# Patient Record
Sex: FEMALE | Race: WHITE | Employment: UNEMPLOYED | URBAN - METROPOLITAN AREA
[De-identification: names, ages, dates, MRNs, and addresses within clinical notes are randomized per-mention and may not be internally consistent; named-entity substitution may affect disease eponyms.]

---

## 2017-08-16 PROBLEM — J30.9 ALLERGIC RHINITIS DUE TO ALLERGEN: Status: ACTIVE | Noted: 2017-08-16

## 2019-08-13 PROBLEM — R05.9 COUGH: Status: ACTIVE | Noted: 2019-08-13

## 2021-07-30 ENCOUNTER — HOSPITAL ENCOUNTER (EMERGENCY)
Age: 56
Discharge: HOME | End: 2021-07-30
Attending: EMERGENCY MEDICINE
Payer: MEDICARE

## 2021-07-30 ENCOUNTER — APPOINTMENT (OUTPATIENT)
Dept: GENERAL RADIOLOGY | Age: 56
End: 2021-07-30
Attending: EMERGENCY MEDICINE
Payer: MEDICARE

## 2021-07-30 VITALS
TEMPERATURE: 97.5 F | SYSTOLIC BLOOD PRESSURE: 140 MMHG | DIASTOLIC BLOOD PRESSURE: 67 MMHG | HEIGHT: 63 IN | HEART RATE: 73 BPM | WEIGHT: 107 LBS | RESPIRATION RATE: 16 BRPM | BODY MASS INDEX: 18.96 KG/M2 | OXYGEN SATURATION: 98 %

## 2021-07-30 DIAGNOSIS — J39.2 PHARYNGEAL PAIN: Primary | ICD-10-CM

## 2021-07-30 PROCEDURE — 99283 EMERGENCY DEPT VISIT LOW MDM: CPT

## 2021-07-30 PROCEDURE — 70360 X-RAY EXAM OF NECK: CPT

## 2021-07-30 PROCEDURE — 74011250637 HC RX REV CODE- 250/637: Performed by: EMERGENCY MEDICINE

## 2021-07-30 RX ORDER — TRIPROLIDINE/PSEUDOEPHEDRINE 2.5MG-60MG
400 TABLET ORAL
Status: COMPLETED | OUTPATIENT
Start: 2021-07-30 | End: 2021-07-30

## 2021-07-30 RX ADMIN — IBUPROFEN 400 MG: 200 SUSPENSION ORAL at 19:01

## 2021-07-30 NOTE — ED TRIAGE NOTES
Patient arrives ambulatory to triage with mask in place. Patient reports swallowing chicken bone at adult . Reports difficulty swallowing. Denies shortness of breath, able to speak in complete sentences. Hx TBI.

## 2021-07-30 NOTE — ED NOTES
I have reviewed discharge instructions with the patient and caregiver. The patient and caregiver verbalized understanding. Patient left ED via Discharge Method: ambulatory to Home with her family member. Opportunity for questions and clarification provided. Patient given 0 scripts. To continue your aftercare when you leave the hospital, you may receive an automated call from our care team to check in on how you are doing.  This is a free service and part of our promise to provide the best care and service to meet your aftercare needs. \" If you have questions, or wish to unsubscribe from this service please call [phone].  Thank you for Choosing our [facility] Emergency Department.

## 2021-07-30 NOTE — ED PROVIDER NOTES
50-year-old white female with underlying developmental delay was at  today around noon and thinks she may have swallowed a small piece of chicken bone. She is complaining of some discomfort in her throat. She has been able to drink fluids without difficulty. She has not tried solids. No chest pain or shortness of breath. No vomiting. The history is provided by the patient. Foreign Body Swallowed  Pertinent negatives include no fever, no chest pain and no vomiting. Past Medical History:   Diagnosis Date    Acute gastrointestinal bleeding 9/30/2015    Anxiety and depression     ASCAD-Native Artery w/o Angina Pectoris, ASCAD NOS 5/10/2016    Asthma exacerbation     chronic steroids    Autism 9/25/2015    Chest pain 9/24/2015    Chronic brain syndrome 5/10/2016    Coronary artery disease     Essential hypertension 5/10/2016    Hyperlipidemia 5/10/2016    Moderate persistent asthma without complication 2/01/6223    [facility]STEMI [facility]non-ST elevated [facility] infarction) [facility]     Seizures ([facility] 9/25/2015    TBI (traumatic brain injury) ([facility] 9/25/2015    Type 1 diabetes mellitus ([facility] 9/24/2015    Type 2 diabetes mellitus without complications ([facility] 7/99/5191       Past Surgical History:   Procedure Laterality Date    HX CORONARY STENT PLACEMENT  9/2015.     HX HEART CATHETERIZATION      HX ORTHOPAEDIC           Family History:   Problem Relation Age of Onset    Stroke Mother     Heart Disease Father     Coronary Artery Disease Father     Diabetes Other     Hypertension Other        Social History     Socioeconomic History    Marital status: SINGLE     Spouse name: Not on file    Number of children: Not on file    Years of education: Not on file    Highest education level: Not on file   Occupational History    Not on file   Tobacco Use    Smoking status: Never Smoker    Smokeless tobacco: Never Used   Substance and Sexual Activity    Alcohol use: No     Alcohol/week: 0.0 standard drinks    Drug use: No    Sexual activity: Not on file   Other Topics Concern    Not on file   Social History Narrative    Sister, Geni Hunt, is her caregiver now. Previously cared for by her father. She has had traumatic brain injury. Social Determinants of Health     Financial Resource Strain:     Difficulty of Paying Living Expenses:    Food Insecurity:     Worried About Running Out of Food in the Last Year:     [address] in the Last Year:    Transportation Needs:     Lack of Transportation (Medical):  Lack of Transportation (Non-Medical):    Physical Activity:     Days of Exercise per Week:     Minutes of Exercise per Session:    Stress:     Feeling of Stress :    Social Connections:     Frequency of Communication with Friends and Family:     Frequency of Social Gatherings with Friends and Family:     Attends Church Services:     Active Member of Clubs or Organizations:     Attends Club or Organization Meetings:     Marital Status:    Intimate Partner Violence:     Fear of Current or Ex-Partner:     Emotionally Abused:     Physically Abused:     Sexually Abused: ALLERGIES: Keppra [levetiracetam]; Other medication; Other plant, animal, environmental; and Statins-hmg-coa reductase inhibitors    Review of Systems   Constitutional: Negative for fever. Respiratory: Negative for shortness of breath. Cardiovascular: Negative for chest pain. Gastrointestinal: Negative for vomiting. Neurological: Negative for headaches. Vitals:    07/30/21 1618 07/30/21 1911   BP: (!) 147/68 (!) 140/67   Pulse: 73    Resp: 16    Temp: 97.5 °F (36.4 °C)    SpO2: 98% 98%   Weight: 48.5 kg (107 lb)    Height: 5' 3\" (1.6 m)             Physical Exam  Vitals and nursing note reviewed. Constitutional:       General: She is not in acute distress. Appearance: Normal appearance. She is not toxic-appearing. HENT:      Head: Normocephalic and atraumatic.       Nose: Nose normal.      Mouth/Throat:      Mouth: Mucous membranes are moist.      Pharynx: Oropharynx is clear. Comments: No signs of trauma. No visible foreign bodies. No drooling. Voice is clear. Eyes:      Conjunctiva/sclera: Conjunctivae normal.      Pupils: Pupils are equal, round, and reactive to light. Neck:      Comments: No stridor  Cardiovascular:      Rate and Rhythm: Normal rate and regular rhythm. Heart sounds: No murmur heard. Pulmonary:      Effort: Pulmonary effort is normal.      Breath sounds: Normal breath sounds. No wheezing. Musculoskeletal:      Cervical back: Normal range of motion and neck supple. Skin:     General: Skin is warm and dry. Neurological:      Mental Status: She is alert. Psychiatric:         Mood and Affect: Mood normal.         Behavior: Behavior normal.          MDM  Number of Diagnoses or Management Options  Diagnosis management comments: X-ray of the neck shows no foreign body. Patient is tolerating intake of both liquids and solids without difficulty. At this time I do not suspect retained foreign body but suspect possible pharyngeal abrasion. She appears safe for discharge home. Follow-up with primary care on Monday.   If symptoms persist she may need GI referral.       Amount and/or Complexity of Data Reviewed  Tests in the radiology section of CPT®: ordered and reviewed  Tests in the medicine section of CPT®: reviewed and ordered  Independent visualization of images, tracings, or specimens: yes    Risk of Complications, Morbidity, and/or Mortality  Presenting problems: moderate  Diagnostic procedures: moderate  Management options: moderate           Procedures

## 2022-03-19 PROBLEM — R05.9 COUGH: Status: ACTIVE | Noted: 2019-08-13

## 2022-03-20 PROBLEM — J30.9 ALLERGIC RHINITIS: Status: ACTIVE | Noted: 2017-08-16

## 2022-07-01 NOTE — PROGRESS NOTES
She is a 25-year-old female seen today for follow-up of asthma, allergic rhinitis, chronic cough. Em Vaz brother assists with history.  Her sister, who usually accompanies her to appointments, as well as her sister's  passed away secondary to Covid.     She is currently residing with her brother, Nasreen More. She is appropriately conversant during today's appointment.     DIAGNOSTICS:       ECHO 9/2015 - EF 55 - 60%. Cardiac cath/stent 9/2015 (severe multivessel disease; stent to LAD, left CX)    CXR 9/2015. CXR 10/30/2015. Spirometry 1/25/2016 - normal, borderline mild obstruction. Spirometry 12/5/2016 moderately severe obstructive defect with decreased FVC, significant decline in FVC and FEV1. Spirometry 8/16/2017mild restrictive defect, significant improvement especially in FEV1.   Spirometry 8/10/2018mild restrictive defect.  No significant interval change. Remains improved compared to 2016 study. Spirometry 8/13/2019mild restrictive defect, no significant change.

## 2022-07-05 ENCOUNTER — OFFICE VISIT (OUTPATIENT)
Dept: PULMONOLOGY | Age: 57
End: 2022-07-05
Payer: MEDICARE

## 2022-07-05 VITALS
DIASTOLIC BLOOD PRESSURE: 58 MMHG | HEIGHT: 63 IN | OXYGEN SATURATION: 98 % | BODY MASS INDEX: 23.21 KG/M2 | TEMPERATURE: 96.9 F | SYSTOLIC BLOOD PRESSURE: 126 MMHG | HEART RATE: 89 BPM | WEIGHT: 131 LBS

## 2022-07-05 DIAGNOSIS — J45.40 MODERATE PERSISTENT ASTHMA WITHOUT COMPLICATION: Primary | Chronic | ICD-10-CM

## 2022-07-05 DIAGNOSIS — F09 CHRONIC BRAIN SYNDROME: ICD-10-CM

## 2022-07-05 DIAGNOSIS — J30.2 SEASONAL ALLERGIC RHINITIS, UNSPECIFIED TRIGGER: ICD-10-CM

## 2022-07-05 LAB
EXPIRATORY TIME: NORMAL
FEF 25-75% %PRED-PRE: NORMAL
FEF 25-75% PRED: NORMAL
FEF 25-75%-PRE: NORMAL
FEV1 %PRED-PRE: 56 %
FEV1 PRED: NORMAL
FEV1/FVC %PRED-PRE: NORMAL
FEV1/FVC PRED: NORMAL
FEV1/FVC: 73 %
FEV1: 1.44 L
FVC %PRED-PRE: 60 %
FVC PRED: NORMAL
FVC: 1.97 L
PEF %PRED-PRE: NORMAL
PEF PRED: NORMAL
PEF-PRE: NORMAL

## 2022-07-05 PROCEDURE — 99213 OFFICE O/P EST LOW 20 MIN: CPT | Performed by: NURSE PRACTITIONER

## 2022-07-05 PROCEDURE — 3017F COLORECTAL CA SCREEN DOC REV: CPT | Performed by: NURSE PRACTITIONER

## 2022-07-05 PROCEDURE — G8427 DOCREV CUR MEDS BY ELIG CLIN: HCPCS | Performed by: NURSE PRACTITIONER

## 2022-07-05 PROCEDURE — 94010 BREATHING CAPACITY TEST: CPT | Performed by: NURSE PRACTITIONER

## 2022-07-05 PROCEDURE — G8420 CALC BMI NORM PARAMETERS: HCPCS | Performed by: NURSE PRACTITIONER

## 2022-07-05 PROCEDURE — 1036F TOBACCO NON-USER: CPT | Performed by: NURSE PRACTITIONER

## 2022-07-05 RX ORDER — IPRATROPIUM BROMIDE 21 UG/1
SPRAY, METERED NASAL
Qty: 1 EACH | Refills: 11 | Status: SHIPPED | OUTPATIENT
Start: 2022-07-05

## 2022-07-05 ASSESSMENT — PULMONARY FUNCTION TESTS
FVC_PERCENT_PREDICTED_PRE: 60
FEV1/FVC: 73
FEV1_PERCENT_PREDICTED_PRE: 56
FEV1: 1.44
FVC: 1.97

## 2022-07-05 ASSESSMENT — ENCOUNTER SYMPTOMS
NAUSEA: 0
SINUS PAIN: 1
CHEST TIGHTNESS: 0
DIARRHEA: 0
CONSTIPATION: 0
COUGH: 0
SHORTNESS OF BREATH: 0
SINUS PRESSURE: 1
WHEEZING: 0
VOMITING: 0
ABDOMINAL PAIN: 0

## 2022-07-05 NOTE — Clinical Note
[facility]  [address] [place] [address] rBo Manriquez [address]  Phone: [phone]  Fax: [address] [facility] DANILO Mendoza CNP        July 5, 2022     Patient: Igor Whitman   YOB: 1965   Date of Visit: 7/5/2022       To Whom It May Concern: It is my medical opinion that Inge Rosen {Work release (duty restriction):28299}. If you have any questions or concerns, please don't hesitate to call.     Sincerely,        DANILO Alonso - CNP

## 2022-07-05 NOTE — PATIENT INSTRUCTIONS
Continue Flovent 1 inhalation twice daily, rinse mouth after use. Continue albuterol via nebulizer, 1 - 4 times daily if needed. May use albuterol inhaler, 2 puffs 4 times daily if needed, when away from home/nebulizer. Continue Flonase 1 spray each nostril twice daily. Continue Singulair 10 mg at bedtime. Stop claritin, take generic zyrtec or allegra every morning, can take benadryl at bedtime. If no benefit from changing claritin to different antihistamine, begin atrovent nasal spray - I printed prescription for this. Order for new nebulizer, tubing and face mask (her preference).  [facility]. [phone]

## 2022-07-05 NOTE — PROGRESS NOTES
[address] Marilynn Adkins. [address] [facility], [address] [facility]  [phone]    Patient Name:  Alyse Novoa    YOB: 1965    Office Visit 7/5/2022      CHIEF COMPLAINT:      Chief Complaint   Patient presents with    Asthma    Cough    Follow-up         HISTORY OF PRESENT ILLNESS:      She is a very pleasant 54-year-old female seen today for follow-up of asthma, allergic rhinitis, chronic cough. Casie Decker brother assists with history and provides transportation to her medical appointments.  Her sister, who usually accompanies her to appointments, as well as her sister's  passed away secondary to Covid.     She is currently residing with her brother, Ana Del Real. Maida Moser also attends adult . She is appropriately conversant during today's appointment. States that she has been doing well from a respiratory standpoint until issues with her usual seasonal allergies. She has had some drainage, cough but no purulent sputum or purulent nasal drainage. Drainage is bothersome both during the day as well as nighttime. Denies fever or chills. No wheezing or significant shortness of breath. She has been taking Claritin in the morning and Benadryl in the evening. Also uses fluticasone nasal spray every morning and Singulair at bedtime. She has tried using nasal saline rinse in the past but did not tolerate it very well. She has been compliant with Flovent inhaler, even using it once daily until recent issues, which prompted her to increase it to twice daily. Uses albuterol intermittently with good response. States that she needs new tubing and facemask for nebulizer. States that she does not do well with mouthpiece tubing.     DIAGNOSTICS:       ECHO 9/2015 - EF 55 - 60%. Cardiac cath/stent 9/2015 (severe multivessel disease; stent to LAD, left CX)    CXR 9/2015. CXR 10/30/2015. Spirometry 1/25/2016 - normal, borderline mild obstruction.   Spirometry 12/5/2016- moderately severe obstructive defect with decreased FVC, significant decline in FVC and FEV1. Spirometry 8/16/2017-mild restrictive defect, significant improvement especially in FEV1.   Spirometry 8/10/2018-mild restrictive defect.  No significant interval change. Remains improved compared to 2016 study. Spirometry 8/13/2019-mild restrictive defect, no significant change.   Spirometry 7/5/2022- suggest mild restrictive defect with slight decline in FVC and FEV1, some of which may be related to weight gain. Past Medical History:   Diagnosis Date    Acute gastrointestinal bleeding 9/30/2015    Anxiety and depression     Asthma exacerbation     chronic steroids    Atherosclerosis of native coronary artery without angina pectoris 5/10/2016    Autism 9/25/2015    Chest pain 9/24/2015    Chronic brain syndrome 5/10/2016    Coronary artery disease     Essential hypertension 5/10/2016    Hyperlipidemia 5/10/2016    Moderate persistent asthma without complication 3/45/6146    NSTEMI (non-ST elevated myocardial infarction) ([address]     Seizures ([address] 9/25/2015    TBI (traumatic brain injury) ([address] 9/25/2015    Type 1 diabetes mellitus ([address] 9/24/2015    Type 2 diabetes mellitus without complications ([address] 2/26/0519       Patient Active Problem List   Diagnosis    Seizures ([address]    Chronic brain syndrome    Hyperlipidemia    Type 2 diabetes mellitus without complications ([address]    Coronary artery disease of native artery of native heart with stable angina pectoris ([address]    TBI (traumatic brain injury) ([address]    Essential hypertension    Cough    Autism    Moderate persistent asthma without complication    Type 2 diabetes mellitus without complication ([address]    Allergic rhinitis         Past Surgical History:   Procedure Laterality Date    CARDIAC CATHETERIZATION      CORONARY ANGIOPLASTY WITH STENT PLACEMENT  9/2015.     ORTHOPEDIC SURGERY           Social History     Socioeconomic History    Marital status: Single     Spouse name: None    Number of children: None    Years of education: None    Highest education level: None   Occupational History    None   Tobacco Use    Smoking status: Never Smoker    Smokeless tobacco: Never Used   Substance and Sexual Activity    Alcohol use: No     Alcohol/week: 0.0 standard drinks    Drug use: No    Sexual activity: None   Other Topics Concern    None   Social History Narrative    He brother, Dipak Mccray, is her caregiver now. Previously cared for by her father. , then her sister. Both have passes away. She has had traumatic brain injury. Social Determinants of Health     Financial Resource Strain:     Difficulty of Paying Living Expenses: Not on file   Food Insecurity:     Worried About Running Out of Food in the Last Year: Not on file    Funmilayo of Food in the Last Year: Not on file   Transportation Needs:     Lack of Transportation (Medical): Not on file    Lack of Transportation (Non-Medical):  Not on file   Physical Activity:     Days of Exercise per Week: Not on file    Minutes of Exercise per Session: Not on file   Stress:     Feeling of Stress : Not on file   Social Connections:     Frequency of Communication with Friends and Family: Not on file    Frequency of Social Gatherings with Friends and Family: Not on file    Attends Mandaeism Services: Not on file    Active Member of [address] [place] [address] [employer] or Organizations: Not on file    Attends Club or Organization Meetings: Not on file    Marital Status: Not on file   Intimate Partner Violence:     Fear of Current or Ex-Partner: Not on file    Emotionally Abused: Not on file    Physically Abused: Not on file    Sexually Abused: Not on file   Housing Stability:     Unable to Pay for Housing in the Last Year: Not on file    Number of [place] in the Last Year: Not on file    Unstable Housing in the Last Year: Not on file       Family History   Problem Relation Age of Onset    Stroke Mother     Heart Disease Father     Coronary Art Dis Father     Hypertension Other     Diabetes Other        Allergies   Allergen Reactions    Levetiracetam Other (See Comments)     Disorientation / confusion        Current Outpatient Medications   Medication Sig    Multiple Vitamin (MULTIVITAMINS PO) Take by mouth    albuterol sulfate  (90 Base) MCG/ACT inhaler Inhale 2 puffs into the lungs every 4 hours as needed    albuterol (PROVENTIL) (2.5 MG/3ML) 0.083% nebulizer solution Inhale 2.5 mg into the lungs 4 times daily    aspirin 81 MG chewable tablet Take 81 mg by mouth daily    vitamin D (CHOLECALCIFEROL) 25 MCG (1000 UT) TABS tablet Take by mouth daily    clopidogrel (PLAVIX) 75 MG tablet Take 75 mg by mouth daily    fluticasone propionate (FLOVENT DISKUS) 250 MCG/BLIST AEPB inhaler 1 inhalation bid, rinse mouth after use    fluticasone (FLONASE) 50 MCG/ACT nasal spray 2 sprays each nostrils as needed    insulin aspart protamine-insulin aspart (NOVOLOG 70/30) (70-30) 100 UNIT/ML injection Inject into the skin every morning (before breakfast)    insulin glargine (LANTUS) 100 UNIT/ML injection vial Inject into the skin    loratadine (CLARITIN) 10 MG tablet Take 10 mg by mouth    montelukast (SINGULAIR) 10 MG tablet 1 po q hs    nitroGLYCERIN (NITROSTAT) 0.3 MG SL tablet Place under the tongue    phenytoin (DILANTIN) 100 MG ER capsule Take 100 mg by mouth 3 times daily     No current facility-administered medications for this visit. REVIEW OF SYSTEMS:    Review of Systems   Constitutional: Positive for unexpected weight change. Negative for chills, fatigue and fever. She has gained 24 pounds over the past year   HENT: Positive for sinus pressure and sinus pain. Respiratory: Negative for cough, chest tightness, shortness of breath and wheezing. Cardiovascular: Negative for chest pain, palpitations and leg swelling.    Gastrointestinal: Negative for abdominal pain, constipation, diarrhea, nausea and vomiting. Neurological: Positive for headaches. Negative for dizziness, tremors, seizures and weakness. PHYSICAL EXAM:    Vitals:    07/05/22 1447   BP: (!) 126/58   Pulse: 89   Temp: 96.9 °F (36.1 °C)   TempSrc: Skin   SpO2: 98%   Weight: 131 lb (59.4 kg)   Height: 5' 3\" (1.6 m)        Body mass index is 23.21 kg/m². GENERAL APPEARANCE:    The patient is normal weight and in no respiratory distress. HEENT:  PERRL. Conjunctivae unremarkable. NECK/LYMPHATIC:   Symmetrical with no elevation of jugular venous pulsation. Trachea midline. No thyroid enlargement. No cervical adenopathy. LUNGS:   Normal respiratory effort with symmetrical lung expansion. Breath sounds-slightly decreased but completely clear. Neftaly Radha HEART:   There is a normal rate and regular rhythm. No murmur, rub, or gallop. There is no edema in the lower extremities. ABDOMEN:  Soft and non-tender. No hepatosplenomegaly. Bowel sounds are normal.      NEURO:  The patient is alert and oriented to person, place, and time. Memory appears intact and mood is somewhat withdrawn less than in past. No gross sensorimotor deficits are present. DIAGNOSTIC TESTS: Studies were personally reviewed by me and discussed with the patient. Spirometry:  Office Spirometry Results Latest Ref Rng & Units 7/5/2022   FVC L 1.97   FEV1 L 1.44   FEV1 %PRED-PRE % 56   FVC %PRED-PRE % 60   FEV1/FVC % 73             ASSESSMENT:   (Medical Decision Making)                                                                                                                                          Encounter Diagnoses   Name Primary?  Moderate persistent asthma without complication Yes    Seasonal allergic rhinitis, unspecified trigger     Chronic brain syndrome      Asthma has been well controlled. Slight decline in spirometry which I suspect is secondary to substantial weight gain since her last study.     Her most bothersome symptoms are related to upper airways and seasonal allergies. She reports compliance with antihistamine, Singulair, steroid nasal spray. She has been intolerant of nasal saline rinse in the past. Will try changing antihistamine, and if no improvement, can try adding atrovent nasal spray. Suggested referral to allergist if above is ineffective. Attends adult day care, now resides with brother since prior care givers have passed away (her father, then her sister). PLAN:     Continue Flovent 1 inhalation twice daily, rinse mouth after use. Continue albuterol via nebulizer, 1 - 4 times daily if needed. May use albuterol inhaler, 2 puffs 4 times daily if needed, when away from home/nebulizer. Continue Flonase 1 spray each nostril twice daily. Continue Singulair 10 mg at bedtime. Stop claritin, take generic zyrtec or allegra every morning, can take benadryl at bedtime. If no benefit from changing claritin to different antihistamine, begin atrovent nasal spray - I printed prescription for this. Order for new nebulizer, tubing and face mask (her preference). [facility]. [phone]      Orders Placed This Encounter    Spirometry Without Bronchodilator    ipratropium (ATROVENT) 0.03 % nasal spray     Si sprays each nostril,  1 - 2 times daily as needed for control of nasal drainage     Dispense:  1 each     Refill:  11           JULIETA VELEZ, APRN - CNP    Total  time spent with patient - 42 min. Over 50% of today's office visit was spent in face to face time reviewing test results, prognosis, importance of compliance, education about disease process, benefits of medications, instructions for management of acute symptoms, and follow up plans. Collaborating MD: Dr. Breana Garcia    Electronically signed. Dictated using voice recognition software.   Proof read but unrecognized errors may exist.

## 2022-07-05 NOTE — LETTER
[facility]  [address] [place] [address] Bro Manriquez [address]  Phone: [phone]  Fax: [phone]    DANILO Boss CNP        July 5, 2022     Patient: Patricia Benitez   YOB: 1965   Date of Visit: 7/5/2022       To Whom It May Concern:    Ms Janak Andino is under our care for her pulmonary conditions, requires assistance from her brother, Talat Dominguez,  to transport her to medical appts. Please excuse him from work today. If you have any questions or concerns, please don't hesitate to call.     Sincerely,        DANILO Boss CNP

## 2022-07-06 DIAGNOSIS — J30.2 SEASONAL ALLERGIC RHINITIS, UNSPECIFIED TRIGGER: ICD-10-CM

## 2022-07-06 DIAGNOSIS — R05.9 COUGH: Primary | ICD-10-CM

## 2022-08-08 NOTE — TELEPHONE ENCOUNTER
MEDICATION REFILL REQUEST      Name of Medication:  Eliquis  Dose:    Frequency:    Quantity:    Days' supply:        Pharmacy Name/Location:  did not leave. Would like someone to call him (Jacinto)brother regarding this Rx.

## 2022-08-09 RX ORDER — CLOPIDOGREL BISULFATE 75 MG/1
75 TABLET ORAL DAILY
Qty: 30 TABLET | Refills: 1 | Status: SHIPPED | OUTPATIENT
Start: 2022-08-09 | End: 2022-09-28 | Stop reason: SDUPTHER

## 2022-09-28 ENCOUNTER — OFFICE VISIT (OUTPATIENT)
Dept: CARDIOLOGY CLINIC | Age: 57
End: 2022-09-28
Payer: MEDICARE

## 2022-09-28 VITALS
DIASTOLIC BLOOD PRESSURE: 60 MMHG | HEIGHT: 63 IN | SYSTOLIC BLOOD PRESSURE: 138 MMHG | WEIGHT: 133 LBS | BODY MASS INDEX: 23.57 KG/M2 | HEART RATE: 79 BPM

## 2022-09-28 DIAGNOSIS — I25.118 CORONARY ARTERY DISEASE OF NATIVE ARTERY OF NATIVE HEART WITH STABLE ANGINA PECTORIS (HCC): Primary | ICD-10-CM

## 2022-09-28 PROCEDURE — 3017F COLORECTAL CA SCREEN DOC REV: CPT | Performed by: INTERNAL MEDICINE

## 2022-09-28 PROCEDURE — G8427 DOCREV CUR MEDS BY ELIG CLIN: HCPCS | Performed by: INTERNAL MEDICINE

## 2022-09-28 PROCEDURE — 99214 OFFICE O/P EST MOD 30 MIN: CPT | Performed by: INTERNAL MEDICINE

## 2022-09-28 PROCEDURE — 1036F TOBACCO NON-USER: CPT | Performed by: INTERNAL MEDICINE

## 2022-09-28 PROCEDURE — 93000 ELECTROCARDIOGRAM COMPLETE: CPT | Performed by: INTERNAL MEDICINE

## 2022-09-28 PROCEDURE — G8420 CALC BMI NORM PARAMETERS: HCPCS | Performed by: INTERNAL MEDICINE

## 2022-09-28 RX ORDER — PITAVASTATIN CALCIUM 2.09 MG/1
2 TABLET, FILM COATED ORAL NIGHTLY
Qty: 90 TABLET | Refills: 3 | Status: SHIPPED | OUTPATIENT
Start: 2022-09-28

## 2022-09-28 RX ORDER — CLOPIDOGREL BISULFATE 75 MG/1
75 TABLET ORAL DAILY
Qty: 90 TABLET | Refills: 3 | Status: SHIPPED | OUTPATIENT
Start: 2022-09-28

## 2022-09-28 RX ORDER — PITAVASTATIN CALCIUM 2.09 MG/1
TABLET, FILM COATED ORAL NIGHTLY
COMMUNITY
End: 2022-09-28 | Stop reason: SDUPTHER

## 2022-09-28 RX ORDER — NITROGLYCERIN 0.3 MG/1
0.3 TABLET SUBLINGUAL EVERY 5 MIN PRN
Qty: 30 TABLET | Refills: 3 | Status: SHIPPED | OUTPATIENT
Start: 2022-09-28

## 2022-09-28 ASSESSMENT — ENCOUNTER SYMPTOMS
BACK PAIN: 0
ABDOMINAL PAIN: 0
SHORTNESS OF BREATH: 0
COUGH: 0

## 2022-09-28 NOTE — PROGRESS NOTES
[facility] CARDIOLOGY  [address], [address], [address]  [address]      [address]      NAME:  Israel Lane  : 1965  MRN: [id number]      SUBJECTIVE:   Israel Lane is a 61 y.o. female seen for a follow up visit regarding the following:     Chief Complaint   Patient presents with    Coronary Artery Disease     Ls        HPI:    Patient admitted 2015 with NSTEMI and cardiac catheterization with 3vCAD and normal LVEF. She was thought to be high risk for CABG and had PCI of the LAD and LCx. The RCA is a  and fills well by collaterals. Patient has done well on medical therapy. She has chronic anxiety and depression and struggles with these issues. Developed leg pain and weakness. Stopped rosuvastatin and resolved. Past Medical History, Past Surgical History, Family history, Social History, and Medications were all reviewed with the patient today and updated as necessary.      Current Outpatient Medications   Medication Sig Dispense Refill    clopidogrel (PLAVIX) 75 MG tablet Take 1 tablet by mouth daily 90 tablet 3    nitroGLYCERIN (NITROSTAT) 0.3 MG SL tablet Place 1 tablet under the tongue every 5 minutes as needed for Chest pain 30 tablet 3    pitavastatin (LIVALO) 2 MG TABS tablet Take 1 tablet by mouth nightly 90 tablet 3    Multiple Vitamin (MULTIVITAMINS PO) Take by mouth      ipratropium (ATROVENT) 0.03 % nasal spray 2 sprays each nostril,  1 - 2 times daily as needed for control of nasal drainage 1 each 11    albuterol sulfate  (90 Base) MCG/ACT inhaler Inhale 2 puffs into the lungs every 4 hours as needed      albuterol (PROVENTIL) (2.5 MG/3ML) 0.083% nebulizer solution Inhale 2.5 mg into the lungs 4 times daily      aspirin 81 MG chewable tablet Take 81 mg by mouth daily      vitamin D (CHOLECALCIFEROL) 25 MCG (1000 UT) TABS tablet Take by mouth daily      fluticasone propionate (FLOVENT DISKUS) 250 MCG/BLIST AEPB inhaler 1 inhalation bid, rinse mouth after use      fluticasone (FLONASE) 50 MCG/ACT nasal spray 2 sprays each nostrils as needed      insulin aspart protamine-insulin aspart (NOVOLOG 70/30) (70-30) 100 UNIT/ML injection Inject into the skin every morning (before breakfast)      insulin glargine (LANTUS) 100 UNIT/ML injection vial Inject into the skin      loratadine (CLARITIN) 10 MG tablet Take 10 mg by mouth      montelukast (SINGULAIR) 10 MG tablet 1 po q hs      phenytoin (DILANTIN) 100 MG ER capsule Take 100 mg by mouth 3 times daily       No current facility-administered medications for this visit. Patient Active Problem List    Diagnosis Date Noted    Cough 08/13/2019    Allergic rhinitis 08/16/2017    Chronic brain syndrome 05/10/2016    Hyperlipidemia 05/10/2016    Type 2 diabetes mellitus without complications ([facility] 90/86/6680    Coronary artery disease of native artery of native heart with stable angina pectoris ([facility] 05/10/2016     09/2015:  3vCAD and  RCA  09/2015:  2.5x28 Xience LCx, 2.5x23 Xience LAD        Essential hypertension 05/10/2016    Moderate persistent asthma without complication 24/80/1015    Seizures ([facility] 09/25/2015    TBI (traumatic brain injury) ([facility] 09/25/2015    Autism 09/25/2015    Type 2 diabetes mellitus without complication ([facility] 21/51/2217      Family History   Problem Relation Age of Onset    Stroke Mother     Heart Disease Father     Coronary Art Dis Father     Hypertension Other     Diabetes Other      Social History     Tobacco Use    Smoking status: Never    Smokeless tobacco: Never   Substance Use Topics    Alcohol use: No     Alcohol/week: 0.0 standard drinks       Review Of Symptoms    Review of Systems   Constitutional: Negative for fever and malaise/fatigue. HENT:  Negative for nosebleeds. Cardiovascular:  Negative for chest pain, dyspnea on exertion and palpitations. Respiratory:  Negative for cough and shortness of breath.     Musculoskeletal:  Negative for back pain, muscle cramps, muscle weakness and myalgias. Gastrointestinal:  Negative for abdominal pain. Neurological:  Negative for dizziness. Physical Exam  Blood pressure 138/60, pulse 79, height 5' 3\" (1.6 m), weight 133 lb (60.3 kg). Physical Exam  HENT:      Head: Normocephalic and atraumatic. Eyes:      Extraocular Movements: Extraocular movements intact. Cardiovascular:      Rate and Rhythm: Normal rate and regular rhythm. Heart sounds: No murmur heard. Pulmonary:      Effort: Pulmonary effort is normal.      Breath sounds: Normal breath sounds. Abdominal:      Palpations: Abdomen is soft. Musculoskeletal:         General: Deformity present. Normal range of motion. Skin:     General: Skin is warm and dry. Neurological:      General: No focal deficit present. Mental Status: She is oriented to person, place, and time. Medical problems, medical history and test results were reviewed with the patient today. No results found for: CHOL  No results found for: TRIG  No results found for: HDL  No results found for: LDLCHOLESTEROL, LDLCALC  No results found for: LABVLDL, VLDL  No results found for: CHOLHDLRATIO     No results found for: LABA1C  No results found for: EAG     No results found for: NA, K, CL, CO2, BUN, CREATININE, GLUCOSE, CALCIUM, PROT, LABALBU, BILITOT, ALKPHOS, AST, ALT, LABGLOM, GFRAA, AGRATIO, GLOB    No results found for: NA, K, CL, CO2, BUN, CREATININE, GLUCOSE, CALCIUM     No results found for: WBC, HGB, HCT, MCV, PLT          ASSESSMENT:      Coronary artery disease of native artery of native heart with stable angina pectoris (HCC) - No angina. Medical therapy appropriate. On ASA and Clopidogrel. Essential hypertension with goal blood pressure less than 130/85 - Stable therapy    Pure hypercholesterolemia - On pitavastatin and  12/2021. Intolerant of higher doses. May need Repatha. Type 2 diabetes mellitus without complication [facility]) - Medical therapy.   Hgb A1c 7.6% - 09/2019      Problem List Items Addressed This Visit          Circulatory    Coronary artery disease of native artery of native heart with stable angina pectoris (HCC) - Primary    Relevant Medications    nitroGLYCERIN (NITROSTAT) 0.3 MG SL tablet    pitavastatin (LIVALO) 2 MG TABS tablet    Other Relevant Orders    EKG 12 lead (Completed)       Medications Discontinued During This Encounter   Medication Reason    nitroGLYCERIN (NITROSTAT) 0.3 MG SL tablet REORDER    clopidogrel (PLAVIX) 75 MG tablet REORDER    pitavastatin (LIVALO) 2 MG TABS tablet REORDER                   Patient has been instructed and agrees to call our office with any issues or other concerns related to their cardiac condition(s) and/or complaint(s). Return in about 4 months (around 1/28/2023).        [address] Azra Laughlin MD  9/28/2022

## 2023-01-12 ENCOUNTER — OFFICE VISIT (OUTPATIENT)
Dept: PULMONOLOGY | Age: 58
End: 2023-01-12
Payer: MEDICARE

## 2023-01-12 VITALS
WEIGHT: 127 LBS | DIASTOLIC BLOOD PRESSURE: 58 MMHG | HEART RATE: 97 BPM | HEIGHT: 63 IN | TEMPERATURE: 97.4 F | OXYGEN SATURATION: 98 % | BODY MASS INDEX: 22.5 KG/M2 | SYSTOLIC BLOOD PRESSURE: 124 MMHG

## 2023-01-12 DIAGNOSIS — J45.40 MODERATE PERSISTENT ASTHMA WITHOUT COMPLICATION: Primary | ICD-10-CM

## 2023-01-12 DIAGNOSIS — J10.1 INFLUENZA A: ICD-10-CM

## 2023-01-12 DIAGNOSIS — J30.2 SEASONAL ALLERGIC RHINITIS, UNSPECIFIED TRIGGER: ICD-10-CM

## 2023-01-12 DIAGNOSIS — R05.2 SUBACUTE COUGH: ICD-10-CM

## 2023-01-12 PROBLEM — E10.649 TYPE 1 DIABETES MELLITUS WITH HYPOGLYCEMIA (HCC): Status: ACTIVE | Noted: 2018-05-01

## 2023-01-12 PROCEDURE — 99214 OFFICE O/P EST MOD 30 MIN: CPT | Performed by: NURSE PRACTITIONER

## 2023-01-12 PROCEDURE — 3017F COLORECTAL CA SCREEN DOC REV: CPT | Performed by: NURSE PRACTITIONER

## 2023-01-12 PROCEDURE — 3078F DIAST BP <80 MM HG: CPT | Performed by: NURSE PRACTITIONER

## 2023-01-12 PROCEDURE — 1036F TOBACCO NON-USER: CPT | Performed by: NURSE PRACTITIONER

## 2023-01-12 PROCEDURE — G8484 FLU IMMUNIZE NO ADMIN: HCPCS | Performed by: NURSE PRACTITIONER

## 2023-01-12 PROCEDURE — 3074F SYST BP LT 130 MM HG: CPT | Performed by: NURSE PRACTITIONER

## 2023-01-12 PROCEDURE — G8427 DOCREV CUR MEDS BY ELIG CLIN: HCPCS | Performed by: NURSE PRACTITIONER

## 2023-01-12 PROCEDURE — G8420 CALC BMI NORM PARAMETERS: HCPCS | Performed by: NURSE PRACTITIONER

## 2023-01-12 RX ORDER — AMOXICILLIN AND CLAVULANATE POTASSIUM 875; 125 MG/1; MG/1
TABLET, FILM COATED ORAL
COMMUNITY
Start: 2023-01-05

## 2023-01-12 RX ORDER — MONTELUKAST SODIUM 10 MG/1
TABLET ORAL
Qty: 90 TABLET | Refills: 3 | Status: SHIPPED | OUTPATIENT
Start: 2023-01-12

## 2023-01-12 RX ORDER — ALBUTEROL SULFATE 90 UG/1
2 AEROSOL, METERED RESPIRATORY (INHALATION) EVERY 4 HOURS PRN
Qty: 3 EACH | Refills: 3 | Status: SHIPPED | OUTPATIENT
Start: 2023-01-12

## 2023-01-12 RX ORDER — FLUTICASONE PROPIONATE 250 UG/1
1 POWDER, METERED RESPIRATORY (INHALATION) 2 TIMES DAILY
Qty: 3 EACH | Refills: 3 | Status: SHIPPED | OUTPATIENT
Start: 2023-01-12

## 2023-01-12 RX ORDER — ALBUTEROL SULFATE 2.5 MG/3ML
2.5 SOLUTION RESPIRATORY (INHALATION) 4 TIMES DAILY
Qty: 360 ML | Refills: 11 | Status: SHIPPED | OUTPATIENT
Start: 2023-01-12

## 2023-01-12 RX ORDER — FLUTICASONE PROPIONATE 50 MCG
2 SPRAY, SUSPENSION (ML) NASAL DAILY
Qty: 3 EACH | Refills: 3 | Status: SHIPPED | OUTPATIENT
Start: 2023-01-12

## 2023-01-12 RX ORDER — AZITHROMYCIN 250 MG/1
TABLET, FILM COATED ORAL
COMMUNITY
Start: 2023-01-05

## 2023-01-12 RX ORDER — DIPHENHYDRAMINE HCL 25 MG
25 TABLET ORAL EVERY 6 HOURS PRN
COMMUNITY

## 2023-01-12 RX ORDER — OSELTAMIVIR PHOSPHATE 75 MG/1
CAPSULE ORAL
COMMUNITY
Start: 2023-01-05

## 2023-01-12 ASSESSMENT — ENCOUNTER SYMPTOMS
WHEEZING: 0
SPUTUM PRODUCTION: 1
COUGH: 1
SHORTNESS OF BREATH: 0

## 2023-01-12 NOTE — PROGRESS NOTES
She is a very pleasant 58-year-old female seen today for follow-up of asthma, allergic rhinitis, chronic cough. Her brother assists with history and provides transportation to her medical appointments. Her sister, who usually accompanies her to appointments, as well as her sister's  passed away secondary to Covid. She is currently residing with her brother, Leatha Cerrato. She also attends adult . She is appropriately conversant during today's appointment. DIAGNOSTICS:       ECHO 9/2015 - EF 55 - 60%. Cardiac cath/stent 9/2015 (severe multivessel disease; stent to LAD, left CX)    CXR 9/2015. CXR 10/30/2015. Spirometry 1/25/2016 - normal, borderline mild obstruction. Spirometry 12/5/2016- moderately severe obstructive defect with decreased FVC, significant decline in FVC and FEV1. Spirometry 8/16/2017-mild restrictive defect, significant improvement especially in FEV1. Spirometry 8/10/2018-mild restrictive defect. No significant interval change. Remains improved compared to 2016 study. Spirometry 8/13/2019-mild restrictive defect, no significant change. Spirometry 7/5/2022- suggest mild restrictive defect with slight decline in FVC and FEV1, some of which may be related to weight gain.

## 2023-01-12 NOTE — Clinical Note
[facility]  [address] [place] [address] [place] [address]  Phone: [phone]  Fax: [phone]    DANILO Wiley CNP        January 12, 2023     Patient: Heladio Chacon   YOB: 1965   Date of Visit: 1/12/2023       To Whom It May Concern: It is my medical opinion that Yousif Gutierrez {Work release (duty restriction):21528}. If you have any questions or concerns, please don't hesitate to call.     Sincerely,        DANILO Wiley - CNP

## 2023-01-12 NOTE — PATIENT INSTRUCTIONS
Complete antibiotics as prescribed by urgent care. Continue Flovent 1 inhalation twice daily, rinse mouth after use. Continue albuterol via nebulizer, use 4 times daily until back to baseline. May use albuterol inhaler, 2 puffs 4 times daily if needed, when away from home/nebulizer. Continue Flonase 1 spray each nostril twice daily, atrovent nasal spray as prescribed. Continue Singulair 10 mg at bedtime. OTC claritin, zyrtec or benadryl - if has drowsiness, have at bedtime. OTC mucolytic, (mucinex or generic equivalent of guaifenesin), 2400mg in 24 hours in divided doses as needed to thin mucous. May need allergy, ENT evaluation if she fails to improve. Plenty of liquids and rest as much as possible. Advised to bring copies COVID, flu and pneumonia immunizations into next visit so we can update her record.

## 2023-01-12 NOTE — PROGRESS NOTES
[address] [facility]. [address] [facility], [address] [facility]  [phone]    Patient Name:  Francisco Burt    YOB: 1965    Office Visit 1/12/2023      CHIEF COMPLAINT:      Chief Complaint   Patient presents with    Asthma    Follow-up         ASSESSMENT:   (Medical Decision Making)                                                                                                                                           Encounter Diagnoses   Name Primary? Moderate persistent asthma without complication Yes    Influenza A     Seasonal allergic rhinitis, unspecified trigger     Subacute cough      Patient with diagnosis of last week, completed Tamiflu. She has also completed 1 of 2 antibiotics. Her brother accompanies her and reports recurring issues with sinusitis which has been an issue historically. She is compliant with antihistamines, steroid nasal spray, Atrovent nasal spray and Singulair. I have discussed evaluation by allergist or ENT, but her brother states that she has lot of medical appointments at this time. She has not tolerated nasal saline rinse previously. She is compliant with Flovent, uses albuterol intermittently with good response. She has wet sounding cough on exam.                    PLAN:     Complete antibiotics as prescribed by urgent care. Continue Flovent 1 inhalation twice daily, rinse mouth after use. Continue albuterol via nebulizer, use 4 times daily until back to baseline. May use albuterol inhaler, 2 puffs 4 times daily if needed, when away from home/nebulizer. Continue Flonase 1 spray each nostril twice daily, atrovent nasal spray as prescribed. Continue Singulair 10 mg at bedtime. OTC claritin, zyrtec or benadryl - if has drowsiness, have at bedtime. OTC mucolytic, (mucinex or generic equivalent of guaifenesin), 2400mg in 24 hours in divided doses as needed to thin mucous.     May need allergy, ENT evaluation if she fails to improve. Plenty of liquids and rest as much as possible. Advised to bring copies COVID, flu and pneumonia immunizations into next visit so we can update her record. Orders Placed This Encounter    albuterol sulfate HFA (PROVENTIL;VENTOLIN;PROAIR) 108 (90 Base) MCG/ACT inhaler     Sig: Inhale 2 puffs into the lungs every 4 hours as needed for Wheezing or Shortness of Breath     Dispense:  3 each     Refill:  3    albuterol (PROVENTIL) (2.5 MG/3ML) 0.083% nebulizer solution     Sig: Take 3 mLs by nebulization 4 times daily     Dispense:  360 mL     Refill:  11    fluticasone (FLONASE) 50 MCG/ACT nasal spray     Si sprays by Nasal route daily 2 sprays each nostrils as needed     Dispense:  3 each     Refill:  3    montelukast (SINGULAIR) 10 MG tablet     Si po q hs     Dispense:  90 tablet     Refill:  3    Fluticasone Propionate, Inhal, (FLOVENT DISKUS) 250 MCG/ACT AEPB     Sig: Inhale 1 inhalation into the lungs 2 times daily Rinse mouth after use     Dispense:  3 each     Refill:  3           DANILO SORENSON - MAURA    Total  time spent with patient - 32 min. Collaborating MD: Dr. Radha Montague:    She is a very pleasant 59-year-old female seen today for follow-up of asthma, allergic rhinitis, chronic cough. Her brother assists with history and provides transportation to her medical appointments. Her sister, who provided transportation to her to appointments, as well as her sister's  passed away secondary to Covid. She is currently residing with her brother, Babette Fothergill. She also attends adult . She is appropriately conversant during today's appointment. Found to have influenza A 2023, treated at urgent care, an med. CXR 2023 demonstrated minimal right basilar airspace disease. Tested negative for COVID. Treated with Tamiflu, Augmentin, Zithromax. DIAGNOSTICS:       ECHO 2015 - EF 55 - 60%.   Cardiac cath/stent 2015 (severe multivessel disease; stent to LAD, left CX)    CXR 9/2015. CXR 10/30/2015. Spirometry 1/25/2016 - normal, borderline mild obstruction. Spirometry 12/5/2016- moderately severe obstructive defect with decreased FVC, significant decline in FVC and FEV1. Spirometry 8/16/2017-mild restrictive defect, significant improvement especially in FEV1. Spirometry 8/10/2018-mild restrictive defect. No significant interval change. Remains improved compared to 2016 study. Spirometry 8/13/2019-mild restrictive defect, no significant change. Spirometry 7/5/2022- suggest mild restrictive defect with slight decline in FVC and FEV1, some of which may be related to weight gain.  _____________________________________________________________      REVIEW OF SYSTEMS:    Review of Systems   Constitutional: Positive for malaise/fatigue. Negative for chills and fever. HENT:  Positive for congestion. Respiratory:  Positive for cough and sputum production. Negative for shortness of breath and wheezing. Diagnosed with influenza A last week, completing treatment prescribed from urgent care. Tested negative for COVID. PHYSICAL EXAM:    Vitals:    01/12/23 1310   BP: (!) 124/58   Site: Right Upper Arm   Position: Sitting   Cuff Size: Large Adult   Pulse: 97   Temp: 97.4 °F (36.3 °C)   TempSrc: Skin   SpO2: 98%   Weight: 127 lb (57.6 kg)   Height: 5' 3\" (1.6 m)        Body mass index is 22.5 kg/m². GENERAL APPEARANCE:  The patient is normal weight and in no respiratory distress. HEENT:  PERRL. Conjunctivae unremarkable. NECK/LYMPHATIC:   Symmetrical with no elevation of jugular venous pulsation. Trachea midline. No thyroid enlargement. No cervical adenopathy. LUNGS:   Normal respiratory effort with symmetrical lung expansion. Breath sounds - decreased but overall are clear. Has wet sounding cough. HEART:   There is a normal rate and regular rhythm. No murmur, rub, or gallop.   There is no edema in the lower extremities. NEURO:  The patient is alert and oriented to person, place, and time. Memory appears intact and mood is normal.  No gross sensorimotor deficits are present. DIAGNOSTIC TESTS: Studies were personally reviewed by me and discussed with the patient. Spirometry:    Office Spirometry Results Latest Ref Rng & Units 7/5/2022   FVC L 1.97   FEV1 L 1.44   FEV1 %PRED-PRE % 56   FVC %PRED-PRE % 60   FEV1/FVC % 73         Past Medical History:   Diagnosis Date    Acute gastrointestinal bleeding 9/30/2015    Anxiety and depression     Asthma exacerbation     chronic steroids    Atherosclerosis of native coronary artery without angina pectoris 5/10/2016    Autism 9/25/2015    Chest pain 9/24/2015    Chronic brain syndrome 5/10/2016    Coronary artery disease     Essential hypertension 5/10/2016    Hyperlipidemia 5/10/2016    Moderate persistent asthma without complication 6/23/4574    NSTEMI (non-ST elevated myocardial infarction) ([address]     Seizures ([address] 9/25/2015    TBI (traumatic brain injury) 9/25/2015    Type 1 diabetes mellitus ([address] 9/24/2015    Type 2 diabetes mellitus without complications ([address] 3/36/1672       Patient Active Problem List   Diagnosis    Seizures ([address]    Chronic brain syndrome    Hyperlipidemia    Type 2 diabetes mellitus without complications ([address]    Coronary artery disease of native artery of native heart with stable angina pectoris (HCC)    TBI (traumatic brain injury)    Essential hypertension    Cough    Autism    Moderate persistent asthma without complication    Type 2 diabetes mellitus without complication ([address]    Allergic rhinitis       Past Surgical History:   Procedure Laterality Date    CARDIAC CATHETERIZATION      CORONARY ANGIOPLASTY WITH STENT PLACEMENT  9/2015.     ORTHOPEDIC SURGERY           Social History     Socioeconomic History    Marital status: Single     Spouse name: None    Number of children: None    Years of education: None Highest education level: None   Tobacco Use    Smoking status: Never    Smokeless tobacco: Never   Substance and Sexual Activity    Alcohol use: No     Alcohol/week: 0.0 standard drinks    Drug use: No   Social History Narrative    He brother, Mikayla Corbett, is her caregiver now. Previously cared for by her father. , then her sister. Both have passes away. She has had traumatic brain injury.          Family History   Problem Relation Age of Onset    Stroke Mother     Heart Disease Father     Coronary Art Dis Father     Hypertension Other     Diabetes Other        Allergies   Allergen Reactions    Levetiracetam Other (See Comments)     Disorientation / confusion        Current Outpatient Medications   Medication Sig    amoxicillin-clavulanate (AUGMENTIN) 875-125 MG per tablet     azithromycin (ZITHROMAX) 250 MG tablet     oseltamivir (TAMIFLU) 75 MG capsule     diphenhydrAMINE (BENADRYL) 25 MG tablet Take 25 mg by mouth every 6 hours as needed    clopidogrel (PLAVIX) 75 MG tablet Take 1 tablet by mouth daily    nitroGLYCERIN (NITROSTAT) 0.3 MG SL tablet Place 1 tablet under the tongue every 5 minutes as needed for Chest pain    pitavastatin (LIVALO) 2 MG TABS tablet Take 1 tablet by mouth nightly    Multiple Vitamin (MULTIVITAMINS PO) Take by mouth    ipratropium (ATROVENT) 0.03 % nasal spray 2 sprays each nostril,  1 - 2 times daily as needed for control of nasal drainage    albuterol sulfate  (90 Base) MCG/ACT inhaler Inhale 2 puffs into the lungs every 4 hours as needed    albuterol (PROVENTIL) (2.5 MG/3ML) 0.083% nebulizer solution Inhale 2.5 mg into the lungs 4 times daily    aspirin 81 MG chewable tablet Take 81 mg by mouth daily    vitamin D (CHOLECALCIFEROL) 25 MCG (1000 UT) TABS tablet Take by mouth daily    fluticasone propionate (FLOVENT DISKUS) 250 MCG/BLIST AEPB inhaler 1 inhalation bid, rinse mouth after use    fluticasone (FLONASE) 50 MCG/ACT nasal spray 2 sprays each nostrils as needed    insulin aspart protamine-insulin aspart (NOVOLOG 70/30) (70-30) 100 UNIT/ML injection Inject into the skin every morning (before breakfast)    insulin glargine (LANTUS) 100 UNIT/ML injection vial Inject into the skin    loratadine (CLARITIN) 10 MG tablet Take 10 mg by mouth    montelukast (SINGULAIR) 10 MG tablet 1 po q hs    phenytoin (DILANTIN) 100 MG ER capsule Take 100 mg by mouth 3 times daily     No current facility-administered medications for this visit. Over 50% of today's office visit was spent in face to face time reviewing test results, prognosis, importance of compliance, education about disease process, benefits of medications, instructions for management of acute symptoms, and follow up plans. Electronically signed. Dictated using voice recognition software.   Proof read but unrecognized errors may exist.

## 2023-01-12 NOTE — LETTER
[facility]  [address] [place] [address] [place] [address]  Phone: [phone]  Fax: [phone], APRN - CNP        January 12, 2023     Patient: Carroll Miner   YOB: 1965   Date of Visit: 1/12/2023       To Whom it May Concern:    Miranda Barreto was seen in my clinic on 1/12/2023. Her brother, Joanie Ramachandran, accompanied her to the office visit. If you have any questions or concerns, please don't hesitate to call.     Sincerely,         DANILO Harding - CNP

## 2023-02-03 ENCOUNTER — OFFICE VISIT (OUTPATIENT)
Dept: CARDIOLOGY CLINIC | Age: 58
End: 2023-02-03
Payer: MEDICARE

## 2023-02-03 VITALS
DIASTOLIC BLOOD PRESSURE: 59 MMHG | HEART RATE: 92 BPM | HEIGHT: 63 IN | BODY MASS INDEX: 22.5 KG/M2 | SYSTOLIC BLOOD PRESSURE: 138 MMHG

## 2023-02-03 DIAGNOSIS — I25.118 CORONARY ARTERY DISEASE OF NATIVE ARTERY OF NATIVE HEART WITH STABLE ANGINA PECTORIS (HCC): Primary | ICD-10-CM

## 2023-02-03 DIAGNOSIS — E78.00 PURE HYPERCHOLESTEROLEMIA: ICD-10-CM

## 2023-02-03 DIAGNOSIS — E11.9 TYPE 2 DIABETES MELLITUS WITHOUT COMPLICATION, WITH LONG-TERM CURRENT USE OF INSULIN (HCC): ICD-10-CM

## 2023-02-03 DIAGNOSIS — Z79.4 TYPE 2 DIABETES MELLITUS WITHOUT COMPLICATION, WITH LONG-TERM CURRENT USE OF INSULIN (HCC): ICD-10-CM

## 2023-02-03 PROCEDURE — 3046F HEMOGLOBIN A1C LEVEL >9.0%: CPT | Performed by: INTERNAL MEDICINE

## 2023-02-03 PROCEDURE — G8484 FLU IMMUNIZE NO ADMIN: HCPCS | Performed by: INTERNAL MEDICINE

## 2023-02-03 PROCEDURE — 1036F TOBACCO NON-USER: CPT | Performed by: INTERNAL MEDICINE

## 2023-02-03 PROCEDURE — 3075F SYST BP GE 130 - 139MM HG: CPT | Performed by: INTERNAL MEDICINE

## 2023-02-03 PROCEDURE — G8428 CUR MEDS NOT DOCUMENT: HCPCS | Performed by: INTERNAL MEDICINE

## 2023-02-03 PROCEDURE — 3017F COLORECTAL CA SCREEN DOC REV: CPT | Performed by: INTERNAL MEDICINE

## 2023-02-03 PROCEDURE — 3078F DIAST BP <80 MM HG: CPT | Performed by: INTERNAL MEDICINE

## 2023-02-03 PROCEDURE — 99214 OFFICE O/P EST MOD 30 MIN: CPT | Performed by: INTERNAL MEDICINE

## 2023-02-03 PROCEDURE — 2022F DILAT RTA XM EVC RTNOPTHY: CPT | Performed by: INTERNAL MEDICINE

## 2023-02-03 PROCEDURE — G8420 CALC BMI NORM PARAMETERS: HCPCS | Performed by: INTERNAL MEDICINE

## 2023-02-03 ASSESSMENT — ENCOUNTER SYMPTOMS
COUGH: 0
BACK PAIN: 0
ABDOMINAL PAIN: 0
SHORTNESS OF BREATH: 0

## 2023-02-03 NOTE — PROGRESS NOTES
[facility] CARDIOLOGY  [id number] [place] [address], [address]      23      NAME:  Zafar Muniz  : 1965  MRN: [id number]      SUBJECTIVE:   Zafar Muniz is a 61 y.o. female seen for a follow up visit regarding the following:     No chief complaint on file. HPI:    Patient admitted 2015 with NSTEMI and cardiac catheterization with 3vCAD and normal LVEF. She was thought to be high risk for CABG and had PCI of the LAD and LCx. The RCA is a  and fills well by collaterals. Patient has done well on medical therapy. She has chronic anxiety and depression and struggles with these issues. Developed leg pain and weakness. Stopped rosuvastatin and resolved. Past Medical History, Past Surgical History, Family history, Social History, and Medications were all reviewed with the patient today and updated as necessary.      Current Outpatient Medications   Medication Sig Dispense Refill    Alirocumab 150 MG/ML SOAJ Inject 150 mg into the skin every 14 days 2 Adjustable Dose Pre-filled Pen Syringe 5    albuterol sulfate HFA (PROVENTIL;VENTOLIN;PROAIR) 108 (90 Base) MCG/ACT inhaler Inhale 2 puffs into the lungs every 4 hours as needed for Wheezing or Shortness of Breath 3 each 3    albuterol (PROVENTIL) (2.5 MG/3ML) 0.083% nebulizer solution Take 3 mLs by nebulization 4 times daily 360 mL 11    montelukast (SINGULAIR) 10 MG tablet 1 po q hs 90 tablet 3    Fluticasone Propionate, Inhal, (FLOVENT DISKUS) 250 MCG/ACT AEPB Inhale 1 inhalation into the lungs 2 times daily Rinse mouth after use 3 each 3    clopidogrel (PLAVIX) 75 MG tablet Take 1 tablet by mouth daily 90 tablet 3    nitroGLYCERIN (NITROSTAT) 0.3 MG SL tablet Place 1 tablet under the tongue every 5 minutes as needed for Chest pain 30 tablet 3    pitavastatin (LIVALO) 2 MG TABS tablet Take 1 tablet by mouth nightly 90 tablet 3    Multiple Vitamin (MULTIVITAMINS PO) Take by mouth      ipratropium (ATROVENT) 0.03 % nasal spray 2 sprays each nostril,  1 - 2 times daily as needed for control of nasal drainage 1 each 11    aspirin 81 MG chewable tablet Take 81 mg by mouth daily      vitamin D (CHOLECALCIFEROL) 25 MCG (1000 UT) TABS tablet Take by mouth daily      insulin aspart protamine-insulin aspart (NOVOLOG 70/30) (70-30) 100 UNIT/ML injection Inject into the skin every morning (before breakfast)      insulin glargine (LANTUS) 100 UNIT/ML injection vial Inject into the skin      loratadine (CLARITIN) 10 MG tablet Take 10 mg by mouth      phenytoin (DILANTIN) 100 MG ER capsule Take 100 mg by mouth 3 times daily      amoxicillin-clavulanate (AUGMENTIN) 875-125 MG per tablet  (Patient not taking: Reported on 2/3/2023)      azithromycin (ZITHROMAX) 250 MG tablet  (Patient not taking: Reported on 2/3/2023)      oseltamivir (TAMIFLU) 75 MG capsule  (Patient not taking: Reported on 2/3/2023)      diphenhydrAMINE (BENADRYL) 25 MG tablet Take 25 mg by mouth every 6 hours as needed (Patient not taking: Reported on 2/3/2023)      fluticasone (FLONASE) 50 MCG/ACT nasal spray 2 sprays by Nasal route daily 2 sprays each nostrils as needed (Patient not taking: Reported on 2/3/2023) 3 each 3     No current facility-administered medications for this visit. Patient Active Problem List    Diagnosis Date Noted    Influenza A 01/12/2023     Priority: Medium    Type 1 diabetes mellitus with hypoglycemia ([facility] 05/01/2018     Priority: Medium     Last Assessment & Plan:   Formatting of this note might be different from the original.  Patient with long-standing type 1 diabetes, strongly brittle. Blood glucose level are variable with some hypoglycemia and hyperglycemia. A1c 7.2%, which is not far off goal.  Blood sugars consistently elevated at night, will increase insulin before dinner by 1 unit. No other changes made at this time. Discussed patient trying a personal CGM again, patient's sister declined.     Yearly screening labs ordered prior to next visit. I will follow-up with her in 6 months, she will call with any problems or concerns. Spent 40 minutes with patient, greater than 50% was spent in counseling and coordination of care. Formatting of this note might be different from the original.  Last Assessment & Plan:   Patient with long-standing type 1 diabetes, strongly brittle. Blood glucose level are variable with some hypoglycemia and hyperglycemia. A1c 7.2%, which is not far off goal.  Blood sugars consistently elevated at night, will increase insulin before dinner by 1 unit. No other changes made at this time. Discussed patient trying a personal CGM again, patient's sister declined. Yearly screening labs ordered prior to next visit. I will follow-up with her in 6 months, she will call with any problems or concerns. Spent 40 minutes with patient, greater than 50% was spent in counseling and coordination of care.       Cough 08/13/2019     Priority: Low    Seasonal allergic rhinitis 08/16/2017     Priority: Low    Chronic brain syndrome 05/10/2016     Priority: Low    Hyperlipidemia 05/10/2016     Priority: Low    Type 2 diabetes mellitus without complications ([facility] 01/47/1224     Priority: Low    Coronary artery disease of native artery of native heart with stable angina pectoris ([facility] [address] 05/10/2016     Priority: Low     09/2015:  3vCAD and  RCA  09/2015:  2.5x28 Xience LCx, 2.5x23 Xience LAD        Essential hypertension 05/10/2016     Priority: Low    Moderate persistent asthma without complication 02/18/5141     Priority: Low    Seizures ([facility] 09/25/2015     Priority: Low    TBI (traumatic brain injury) 09/25/2015     Priority: Low    Autism 09/25/2015     Priority: Low    Type 2 diabetes mellitus without complication ([facility] [facility] 40/71/6263     Priority: Low      Family History   Problem Relation Age of Onset    Stroke Mother     Heart Disease Father     Coronary Art Dis Father     Hypertension Other     Diabetes Other Social History     Tobacco Use    Smoking status: Never    Smokeless tobacco: Never   Substance Use Topics    Alcohol use: No     Alcohol/week: 0.0 standard drinks       Review Of Symptoms    Review of Systems   Constitutional: Negative for fever and malaise/fatigue. HENT:  Negative for nosebleeds. Cardiovascular:  Negative for chest pain, dyspnea on exertion and palpitations. Respiratory:  Negative for cough and shortness of breath. Musculoskeletal:  Negative for back pain, muscle cramps, muscle weakness and myalgias. Gastrointestinal:  Negative for abdominal pain. Neurological:  Negative for dizziness. Physical Exam  Blood pressure (!) 138/59, pulse 92, height 5' 3\" (1.6 m). Physical Exam  HENT:      Head: Normocephalic and atraumatic. Eyes:      Extraocular Movements: Extraocular movements intact. Cardiovascular:      Rate and Rhythm: Normal rate and regular rhythm. Heart sounds: No murmur heard. Pulmonary:      Effort: Pulmonary effort is normal.      Breath sounds: Normal breath sounds. Abdominal:      Palpations: Abdomen is soft. Musculoskeletal:         General: Deformity present. Normal range of motion. Skin:     General: Skin is warm and dry. Neurological:      General: No focal deficit present. Mental Status: She is oriented to person, place, and time. Medical problems, medical history and test results were reviewed with the patient today.       No results found for: CHOL  No results found for: TRIG  No results found for: HDL  No results found for: LDLCHOLESTEROL, LDLCALC  No results found for: LABVLDL, VLDL  No results found for: CHOLHDLRATIO     No results found for: LABA1C  No results found for: EAG     No results found for: NA, K, CL, CO2, BUN, CREATININE, GLUCOSE, CALCIUM, PROT, LABALBU, BILITOT, ALKPHOS, AST, ALT, LABGLOM, GFRAA, AGRATIO, GLOB    No results found for: NA, K, CL, CO2, BUN, CREATININE, GLUCOSE, CALCIUM     No results found for: WBC, HGB, HCT, MCV, PLT          ASSESSMENT:      Coronary artery disease of native artery of native heart with stable angina pectoris (HCC) - No angina. Medical therapy appropriate. On ASA and Clopidogrel. Essential hypertension with goal blood pressure less than 130/85 - Stable therapy    Pure hypercholesterolemia - On pitavastatin 2mg and  12/2022. Intolerant of higher doses. Now on praluent. Recheck in 3-4 months. Type 2 diabetes mellitus without complication [facility]) - Medical therapy. Hgb A1c 7.6% - 09/2019      Problem List Items Addressed This Visit          Circulatory    Coronary artery disease of native artery of native heart with stable angina pectoris ([facility] [address] - Primary    Relevant Medications    Alirocumab 150 MG/ML SOAJ       Endocrine    Type 2 diabetes mellitus without complications ([facility] [address]       Other    Hyperlipidemia    Relevant Medications    Alirocumab 150 MG/ML SOAJ     There are no discontinued medications. Patient has been instructed and agrees to call our office with any issues or other concerns related to their cardiac condition(s) and/or complaint(s). Return in about 6 months (around 8/3/2023).        Enoch Fernandez MD  2/3/2023

## 2023-02-11 PROBLEM — J10.1 INFLUENZA A: Status: RESOLVED | Noted: 2023-01-12 | Resolved: 2023-02-11

## 2023-02-14 ENCOUNTER — TELEPHONE (OUTPATIENT)
Dept: CARDIOLOGY CLINIC | Age: 58
End: 2023-02-14

## 2023-02-14 NOTE — TELEPHONE ENCOUNTER
Cardiac Clearance        Physician or Practice Requesting:Faraz Morales Person: patient contacted us / Juan Sousa is the assistant over there  Contact Phone Number: [phone] another number  Fax Number: [phone]  Date of Surgery/Procedure: not yet scheduled  Type of Surgery or Procedure: both hands carpal tunnel surgery  Type of Anesthesia: na  Type of Clearance Requested: Cardiac Clearance and Medication Hold  Medication to Hold:Plavix   Days to Hold: ? Dr Daniel Thompson recommendation

## 2023-02-14 NOTE — TELEPHONE ENCOUNTER
Influenza A 01/12/2023       Priority: Medium    Type 1 diabetes mellitus with hypoglycemia ([facility] 05/01/2018       Priority: Medium       Last Assessment & Plan:   Formatting of this note might be different from the original.  Patient with long-standing type 1 diabetes, strongly brittle. Blood glucose level are variable with some hypoglycemia and hyperglycemia. A1c 7.2%, which is not far off goal.  Blood sugars consistently elevated at night, will increase insulin before dinner by 1 unit. No other changes made at this time. Discussed patient trying a personal CGM again, patient's sister declined. Yearly screening labs ordered prior to next visit. I will follow-up with her in 6 months, she will call with any problems or concerns. Spent 40 minutes with patient, greater than 50% was spent in counseling and coordination of care. Formatting of this note might be different from the original.  Last Assessment & Plan:   Patient with long-standing type 1 diabetes, strongly brittle. Blood glucose level are variable with some hypoglycemia and hyperglycemia. A1c 7.2%, which is not far off goal.  Blood sugars consistently elevated at night, will increase insulin before dinner by 1 unit. No other changes made at this time. Discussed patient trying a personal CGM again, patient's sister declined. Yearly screening labs ordered prior to next visit. I will follow-up with her in 6 months, she will call with any problems or concerns. Spent 40 minutes with patient, greater than 50% was spent in counseling and coordination of care.        Cough 08/13/2019       Priority: Low    Seasonal allergic rhinitis 08/16/2017       Priority: Low    Chronic brain syndrome 05/10/2016       Priority: Low    Hyperlipidemia 05/10/2016       Priority: Low    Type 2 diabetes mellitus without complications ([facility] 87/81/0112       Priority: Low    Coronary artery disease of native artery of native heart with stable angina pectoris ([facility] 05/10/2016       Priority: Low       09/2015:  3vCAD and  RCA  09/2015:  2.5x28 Xience LCx, 2.5x23 Xience LAD          Essential hypertension 05/10/2016       Priority: Low    Moderate persistent asthma without complication 00/87/4281       Priority: Low    Seizures ([facility] 09/25/2015       Priority: Low    TBI (traumatic brain injury) 09/25/2015       Priority: Low    Autism 09/25/2015       Priority: Low    Type 2 diabetes mellitus without complication ([facility] 90/88/5402       Priority: Low

## 2023-03-17 DIAGNOSIS — J45.40 MODERATE PERSISTENT ASTHMA WITHOUT COMPLICATION: Primary | ICD-10-CM

## 2023-03-17 RX ORDER — ALBUTEROL SULFATE 2.5 MG/3ML
SOLUTION RESPIRATORY (INHALATION)
Qty: 360 ML | Refills: 11 | Status: SHIPPED | OUTPATIENT
Start: 2023-03-17

## 2023-05-02 ENCOUNTER — TELEPHONE (OUTPATIENT)
Dept: CARDIOLOGY CLINIC | Age: 58
End: 2023-05-02

## 2023-05-03 ENCOUNTER — TELEPHONE (OUTPATIENT)
Dept: CARDIOLOGY CLINIC | Age: 58
End: 2023-05-03

## 2023-05-03 NOTE — TELEPHONE ENCOUNTER
Pt brother Niranjan Myles states that clearance for pt to have carpel tunnel surgery was never received when it was sent on 2/16/2023 to Dr. Melvin Blake office and needs to be re faxed [phone] ATTN: Letty Rasmussen.

## 2023-05-22 ENCOUNTER — TELEPHONE (OUTPATIENT)
Dept: PULMONOLOGY | Age: 58
End: 2023-05-22

## 2023-05-22 NOTE — TELEPHONE ENCOUNTER
[facility] [employer], patient has refills for Flovent inhaler but needs script for pen needles, referred to PCP for that, Molly Zee

## 2023-05-22 NOTE — TELEPHONE ENCOUNTER
Patient needs another prescription sent to pharmacy                Disp Refills Start End    Fluticasone Propionate, Inhal, ([facility] [address]) 250 MCG/ACT [place], [place] - [address] [place] [address], [address] [place] [address]   Phone:  [phone]  Fax:  [phone]

## 2023-07-03 ENCOUNTER — OFFICE VISIT (OUTPATIENT)
Dept: PULMONOLOGY | Age: 58
End: 2023-07-03
Payer: MEDICARE

## 2023-07-03 VITALS
SYSTOLIC BLOOD PRESSURE: 121 MMHG | RESPIRATION RATE: 14 BRPM | BODY MASS INDEX: 22.15 KG/M2 | HEIGHT: 63 IN | WEIGHT: 125 LBS | DIASTOLIC BLOOD PRESSURE: 65 MMHG | OXYGEN SATURATION: 99 % | HEART RATE: 75 BPM

## 2023-07-03 DIAGNOSIS — J30.2 SEASONAL ALLERGIC RHINITIS, UNSPECIFIED TRIGGER: ICD-10-CM

## 2023-07-03 DIAGNOSIS — J45.40 MODERATE PERSISTENT ASTHMA WITHOUT COMPLICATION: Primary | ICD-10-CM

## 2023-07-03 DIAGNOSIS — Z87.820 HISTORY OF TRAUMATIC BRAIN INJURY: ICD-10-CM

## 2023-07-03 PROCEDURE — 99214 OFFICE O/P EST MOD 30 MIN: CPT | Performed by: NURSE PRACTITIONER

## 2023-07-03 PROCEDURE — 1036F TOBACCO NON-USER: CPT | Performed by: NURSE PRACTITIONER

## 2023-07-03 PROCEDURE — 3078F DIAST BP <80 MM HG: CPT | Performed by: NURSE PRACTITIONER

## 2023-07-03 PROCEDURE — G8427 DOCREV CUR MEDS BY ELIG CLIN: HCPCS | Performed by: NURSE PRACTITIONER

## 2023-07-03 PROCEDURE — 3017F COLORECTAL CA SCREEN DOC REV: CPT | Performed by: NURSE PRACTITIONER

## 2023-07-03 PROCEDURE — G8420 CALC BMI NORM PARAMETERS: HCPCS | Performed by: NURSE PRACTITIONER

## 2023-07-03 PROCEDURE — 3074F SYST BP LT 130 MM HG: CPT | Performed by: NURSE PRACTITIONER

## 2023-07-03 RX ORDER — FEXOFENADINE HCL 180 MG/1
180 TABLET ORAL DAILY
COMMUNITY

## 2023-07-03 RX ORDER — IPRATROPIUM BROMIDE 21 UG/1
SPRAY, METERED NASAL
Qty: 1 EACH | Refills: 11 | Status: SHIPPED | OUTPATIENT
Start: 2023-07-03

## 2023-07-03 ASSESSMENT — ENCOUNTER SYMPTOMS
SHORTNESS OF BREATH: 0
COUGH: 1
WHEEZING: 0

## 2023-07-03 NOTE — PROGRESS NOTES
[address] [place] [address]  [phone]    Patient Name:  Elina Martin    YOB: 1965    Office Visit 7/3/2023      CHIEF COMPLAINT:      Chief Complaint   Patient presents with    Asthma         ASSESSMENT:   (Medical Decision Making)                                                                                                                                          Encounter Diagnoses   Name Primary? Moderate persistent asthma without complication Yes    Seasonal allergic rhinitis, unspecified trigger     History of traumatic brain injury      She is stable symptomatically and compliant with maintenance therapy for asthma. Lungs are clear on exam.    Allergy symptoms are fairly well controlled with current regimen. History of TBI, she attends adult  and has assistance from her brother who accompanies her to appointment today. PLAN:     Continue Flovent 1 inhalation twice daily, rinse mouth after use. Continue albuterol albuterol inhaler, 2 puffs 4 times daily if needed, or albuterol via nebulizer 4 times daily if needed. Continue Flonase 1 spray each nostril twice daily, atrovent nasal spray as prescribed. Continue Singulair 10 mg at bedtime. Continue allegra daily. Flu vaccine in the fall. Follow up in 6 months with spirometry, sooner if needed. If she is stable at that time, see on annual and as needed basis. Orders Placed This Encounter   Medications    ipratropium (ATROVENT) 0.03 % nasal spray     Si sprays each nostril,  1 - 2 times daily as needed for control of nasal drainage     Dispense:  1 each     Refill:  11           DANILO SORENSON - CNP    Total  time spent with patient - 35 min.      Collaborating MD: Dr. Ferdinand Wheeler:    She is a very pleasant 79-year-old female seen today for follow-up of asthma, allergic rhinitis,

## 2023-07-03 NOTE — PATIENT INSTRUCTIONS
Continue Flovent 1 inhalation twice daily, rinse mouth after use. Continue albuterol albuterol inhaler, 2 puffs 4 times daily if needed, or albuterol via nebulizer 4 times daily if needed. Continue Flonase 1 spray each nostril twice daily, atrovent nasal spray as prescribed. Continue Singulair 10 mg at bedtime. Continue allegra daily. Flu vaccine in the fall. Follow up in 6 months with spirometry, sooner if needed.

## 2023-07-03 NOTE — PROGRESS NOTES
She is a very pleasant 79-year-old female seen today for follow-up of asthma, allergic rhinitis, chronic cough. Her brother assists with history and provides transportation to her medical appointments. Her sister, who provided transportation to her to appointments, as well as her sister's  passed away secondary to Covid. She is currently residing with her brother, Millie Wilson. She also attends adult . She is appropriately conversant during today's appointment. Found to have influenza A 1//2023, treated at urgent care, an [facility]. CXR 1/5/2023 demonstrated minimal right basilar airspace disease. Tested negative for COVID. Treated with Tamiflu, Augmentin, Zithromax. DIAGNOSTICS:       ECHO 9/2015 - EF 55 - 60%. Cardiac cath/stent 9/2015 (severe multivessel disease; stent to LAD, left CX)    CXR 9/2015. CXR 10/30/2015. Spirometry 1/25/2016 - normal, borderline mild obstruction. Spirometry 12/5/2016- moderately severe obstructive defect with decreased FVC, significant decline in FVC and FEV1. Spirometry 8/16/2017-mild restrictive defect, significant improvement especially in FEV1. Spirometry 8/10/2018-mild restrictive defect. No significant interval change. Remains improved compared to 2016 study. Spirometry 8/13/2019-mild restrictive defect, no significant change. Spirometry 7/5/2022- suggest mild restrictive defect with slight decline in FVC and FEV1, some of which may be related to weight gain.

## 2023-08-17 ENCOUNTER — OFFICE VISIT (OUTPATIENT)
Age: 58
End: 2023-08-17
Payer: MEDICARE

## 2023-08-17 VITALS
HEART RATE: 88 BPM | SYSTOLIC BLOOD PRESSURE: 99 MMHG | HEIGHT: 63 IN | BODY MASS INDEX: 22.68 KG/M2 | WEIGHT: 128 LBS | DIASTOLIC BLOOD PRESSURE: 52 MMHG

## 2023-08-17 DIAGNOSIS — E78.00 PURE HYPERCHOLESTEROLEMIA: ICD-10-CM

## 2023-08-17 DIAGNOSIS — I25.118 CORONARY ARTERY DISEASE OF NATIVE ARTERY OF NATIVE HEART WITH STABLE ANGINA PECTORIS (HCC): Primary | ICD-10-CM

## 2023-08-17 DIAGNOSIS — I10 ESSENTIAL HYPERTENSION: ICD-10-CM

## 2023-08-17 DIAGNOSIS — E11.9 TYPE 2 DIABETES MELLITUS WITHOUT COMPLICATION, WITHOUT LONG-TERM CURRENT USE OF INSULIN (HCC): ICD-10-CM

## 2023-08-17 PROCEDURE — 3046F HEMOGLOBIN A1C LEVEL >9.0%: CPT | Performed by: INTERNAL MEDICINE

## 2023-08-17 PROCEDURE — 2022F DILAT RTA XM EVC RTNOPTHY: CPT | Performed by: INTERNAL MEDICINE

## 2023-08-17 PROCEDURE — 1036F TOBACCO NON-USER: CPT | Performed by: INTERNAL MEDICINE

## 2023-08-17 PROCEDURE — 3074F SYST BP LT 130 MM HG: CPT | Performed by: INTERNAL MEDICINE

## 2023-08-17 PROCEDURE — 3017F COLORECTAL CA SCREEN DOC REV: CPT | Performed by: INTERNAL MEDICINE

## 2023-08-17 PROCEDURE — 99214 OFFICE O/P EST MOD 30 MIN: CPT | Performed by: INTERNAL MEDICINE

## 2023-08-17 PROCEDURE — G8428 CUR MEDS NOT DOCUMENT: HCPCS | Performed by: INTERNAL MEDICINE

## 2023-08-17 PROCEDURE — 3078F DIAST BP <80 MM HG: CPT | Performed by: INTERNAL MEDICINE

## 2023-08-17 PROCEDURE — G8420 CALC BMI NORM PARAMETERS: HCPCS | Performed by: INTERNAL MEDICINE

## 2023-08-17 RX ORDER — PITAVASTATIN CALCIUM 2.09 MG/1
2 TABLET, FILM COATED ORAL NIGHTLY
Qty: 90 TABLET | Refills: 3 | Status: SHIPPED | OUTPATIENT
Start: 2023-08-17

## 2023-08-17 RX ORDER — CLOPIDOGREL BISULFATE 75 MG/1
75 TABLET ORAL DAILY
Qty: 90 TABLET | Refills: 3 | Status: SHIPPED | OUTPATIENT
Start: 2023-08-17

## 2023-08-17 ASSESSMENT — ENCOUNTER SYMPTOMS
ABDOMINAL PAIN: 0
BACK PAIN: 0
COUGH: 0
SHORTNESS OF BREATH: 0

## 2023-08-17 NOTE — PROGRESS NOTES
ASA and Clopidogrel. Essential hypertension with goal blood pressure less than 130/85 - Stable therapy    Pure hypercholesterolemia - On pitavastatin 2mg and Praluent.  04/2023. Intolerant of higher doses. Type 2 diabetes mellitus without complication [facility]) - Medical therapy. Hgb A1c 7.8% - 01/2021. No recent labs      Problem List Items Addressed This Visit          Circulatory    Coronary artery disease of native artery of native heart with stable angina pectoris ([address]) - Primary    Relevant Medications    Alirocumab 150 MG/ML SOAJ    pitavastatin (LIVALO) 2 MG TABS tablet    Other Relevant Orders    Transthoracic echocardiogram (TTE) complete with contrast, bubble, strain, and 3D PRN    Essential hypertension    Relevant Orders    Transthoracic echocardiogram (TTE) complete with contrast, bubble, strain, and 3D PRN       Endocrine    Type 2 diabetes mellitus without complication (HCC)       Other    Hyperlipidemia    Relevant Medications    Alirocumab 150 MG/ML SOAJ    pitavastatin (LIVALO) 2 MG TABS tablet     Medications Discontinued During This Encounter   Medication Reason    clopidogrel (PLAVIX) 75 MG tablet REORDER    pitavastatin (LIVALO) 2 MG TABS tablet REORDER    Alirocumab 150 MG/ML SOAJ REORDER                     Patient has been instructed and agrees to call our office with any issues or other concerns related to their cardiac condition(s) and/or complaint(s). Return in about 6 months (around 2/17/2024).        Chandler Bowie MD  8/17/2023

## 2023-10-09 RX ORDER — PITAVASTATIN CALCIUM 2.09 MG/1
2 TABLET, FILM COATED ORAL NIGHTLY
Qty: 90 TABLET | Refills: 3 | Status: SHIPPED | OUTPATIENT
Start: 2023-10-09

## 2023-10-23 RX ORDER — CLOPIDOGREL BISULFATE 75 MG/1
75 TABLET ORAL DAILY
Qty: 90 TABLET | Refills: 3 | Status: SHIPPED | OUTPATIENT
Start: 2023-10-23

## 2023-10-23 NOTE — TELEPHONE ENCOUNTER
MEDICATION REFILL REQUEST      Name of Medication:  plavix  Dose:  75 mg  Frequency:  daily   Quantity:    Days' supply:  90      Pharmacy Name/Location:  [address] [place] [address] patient

## 2024-01-09 ENCOUNTER — TELEPHONE (OUTPATIENT)
Age: 59
End: 2024-01-09

## 2024-01-09 NOTE — TELEPHONE ENCOUNTER
Patients  is called stating that insurance is denying some of her meds but could not remember the name.

## 2024-01-17 NOTE — PROGRESS NOTES
She is a very pleasant 58-year-old female seen today for follow-up of asthma, allergic rhinitis, chronic cough.  Her brother assists with history and provides transportation to her medical appointments.  Her sister, who provided transportation to her to appointments, as well as her sister's  passed away secondary to Covid.     She is currently residing with her brother, Jacinto.  She also attends adult .  She is appropriately conversant during today's appointment.        DIAGNOSTICS:       ECHO 9/2015 - EF 55 - 60%.  Cardiac cath/stent 9/2015 (severe multivessel disease; stent to LAD, left CX)    CXR 9/2015.  CXR 10/30/2015.  Spirometry 1/25/2016 - normal, borderline mild obstruction.  Spirometry 12/5/2016- moderately severe obstructive defect with decreased FVC, significant decline in FVC and FEV1.  Spirometry 8/16/2017-mild restrictive defect, significant improvement especially in FEV1.   Spirometry 8/10/2018-mild restrictive defect.  No significant interval change. Remains improved compared to 2016 study.  Spirometry 8/13/2019-mild restrictive defect, no significant change.   Spirometry 7/5/2022- suggest mild restrictive defect with slight decline in FVC and FEV1, some of which may be related to weight gain.

## 2024-01-18 ENCOUNTER — OFFICE VISIT (OUTPATIENT)
Dept: PULMONOLOGY | Age: 59
End: 2024-01-18

## 2024-01-18 VITALS
TEMPERATURE: 97.4 F | SYSTOLIC BLOOD PRESSURE: 123 MMHG | OXYGEN SATURATION: 98 % | BODY MASS INDEX: 21.62 KG/M2 | HEIGHT: 63 IN | RESPIRATION RATE: 14 BRPM | HEART RATE: 75 BPM | WEIGHT: 122 LBS | DIASTOLIC BLOOD PRESSURE: 63 MMHG

## 2024-01-18 DIAGNOSIS — Z87.820 HISTORY OF TRAUMATIC BRAIN INJURY: ICD-10-CM

## 2024-01-18 DIAGNOSIS — J30.2 SEASONAL ALLERGIC RHINITIS, UNSPECIFIED TRIGGER: ICD-10-CM

## 2024-01-18 DIAGNOSIS — J45.40 MODERATE PERSISTENT ASTHMA WITHOUT COMPLICATION: Primary | ICD-10-CM

## 2024-01-18 LAB
EXPIRATORY TIME: NORMAL
FEF 25-75% %PRED-PRE: NORMAL
FEF 25-75% PRED: NORMAL
FEF 25-75-PRE: NORMAL
FEV1 %PRED-PRE: 80 %
FEV1 PRED: NORMAL
FEV1/FVC %PRED-PRE: 77 %
FEV1/FVC PRED: NORMAL
FEV1/FVC: NORMAL
FEV1: 2 L
FVC %PRED-PRE: 81 %
FVC PRED: NORMAL
FVC: 2.59 L
PEF %PRED-PRE: NORMAL
PEF PRED: NORMAL
PEF-PRE: NORMAL

## 2024-01-18 RX ORDER — MONTELUKAST SODIUM 10 MG/1
TABLET ORAL
Qty: 90 TABLET | Refills: 3 | Status: SHIPPED | OUTPATIENT
Start: 2024-01-18

## 2024-01-18 RX ORDER — IPRATROPIUM BROMIDE 21 UG/1
SPRAY, METERED NASAL
Qty: 1 EACH | Refills: 11 | Status: SHIPPED | OUTPATIENT
Start: 2024-01-18

## 2024-01-18 RX ORDER — ALBUTEROL SULFATE 90 UG/1
2 AEROSOL, METERED RESPIRATORY (INHALATION) EVERY 4 HOURS PRN
Qty: 3 EACH | Refills: 3 | Status: SHIPPED | OUTPATIENT
Start: 2024-01-18

## 2024-01-18 RX ORDER — ALBUTEROL SULFATE 2.5 MG/3ML
SOLUTION RESPIRATORY (INHALATION)
Qty: 360 ML | Refills: 11 | Status: SHIPPED | OUTPATIENT
Start: 2024-01-18

## 2024-01-18 RX ORDER — AZELASTINE 1 MG/ML
SPRAY, METERED NASAL
Qty: 60 ML | Refills: 1 | Status: SHIPPED | OUTPATIENT
Start: 2024-01-18

## 2024-01-18 RX ORDER — FLUTICASONE PROPIONATE 50 MCG
2 SPRAY, SUSPENSION (ML) NASAL DAILY
Qty: 3 EACH | Refills: 3 | Status: SHIPPED | OUTPATIENT
Start: 2024-01-18

## 2024-01-18 ASSESSMENT — ENCOUNTER SYMPTOMS
SHORTNESS OF BREATH: 0
COUGH: 0
HEMOPTYSIS: 0
SPUTUM PRODUCTION: 0
WHEEZING: 0

## 2024-01-18 ASSESSMENT — PULMONARY FUNCTION TESTS
FVC: 2.59
FVC_PERCENT_PREDICTED_PRE: 81
FEV1_PERCENT_PREDICTED_PRE: 80
FEV1: 2.00
FEV1/FVC_PERCENT_PREDICTED_PRE: 77

## 2024-01-18 NOTE — PROGRESS NOTES
[facility]  [address]  [place] [address]  [phone]    Patient Name:  Deedee Ordoñez    YOB: 1965    Office Visit 1/18/2024      CHIEF COMPLAINT:      No chief complaint on file.        ASSESSMENT:   (Medical Decision Making)                                                                                                                                          Encounter Diagnoses   Name Primary?    Moderate persistent asthma without complication Yes    Seasonal allergic rhinitis, unspecified trigger     History of traumatic brain injury                            PLAN:     Continue Flovent 1 inhalation once or twice daily, rinse mouth after use.     Continue albuterol albuterol inhaler, 2 puffs 4 times daily if needed, or albuterol via nebulizer 4 times daily if needed.     Continue Flonase 1 each nostril twice daily    atrovent nasal spray - 2 sprays each nostril 1 - 2 times daily.    Continue azelastine 1 spray each nostril daily.     Continue Singulair 10 mg at bedtime.  I reviewed warning related to Singulair in terms of potential mood disturbance.  She has tolerated this well without any difficulty.     Continue allegra daily, can take generic fexofenadine 1 daily.       Orders Placed This Encounter   Procedures    Spirometry Without Bronchodilator     Standing Status:   Future     Number of Occurrences:   1     Standing Expiration Date:   1/18/2025       Orders Placed This Encounter   Medications    albuterol (PROVENTIL) (2.5 MG/3ML) 0.083% nebulizer solution     Sig: 3 mL via nebulizer 4 times daily as needed for shortness of breath or wheezing.  Diagnosis-asthma, J 45.40, bill to Medicare part B     Dispense:  360 mL     Refill:  11    albuterol sulfate HFA (PROVENTIL;VENTOLIN;PROAIR) 108 (90 Base) MCG/ACT inhaler     Sig: Inhale 2 puffs into the lungs every 4 hours as needed for Wheezing or Shortness of Breath     Dispense:  3 each

## 2024-01-18 NOTE — PATIENT INSTRUCTIONS
Continue Flovent 1 inhalation once or twice daily, rinse mouth after use.     Continue albuterol albuterol inhaler, 2 puffs 4 times daily if needed, or albuterol via nebulizer 4 times daily if needed.     Continue Flonase 1 each nostril twice daily    atrovent nasal spray - 2 sprays each nostril 1 - 2 times daily.    Continue azelastine 1 spray each nostril daily.     Continue Singulair 10 mg at bedtime.     Continue allegra daily, can take generic fexofenadine 1 daily.

## 2024-01-29 ENCOUNTER — TELEPHONE (OUTPATIENT)
Age: 59
End: 2024-01-29

## 2024-02-19 NOTE — TELEPHONE ENCOUNTER
Called [employer] to check status of Livalo. Since patient has a Medicare  part D plan, PA must be submitted through Part D paln. Submitted new PA through [employer] via Cover My Meds.     Livalo 2MG tablets    Approved today  Your request has been approved.     aware of approval.

## 2024-02-22 ENCOUNTER — OFFICE VISIT (OUTPATIENT)
Age: 59
End: 2024-02-22

## 2024-02-22 VITALS
BODY MASS INDEX: 21.79 KG/M2 | HEIGHT: 63 IN | DIASTOLIC BLOOD PRESSURE: 58 MMHG | SYSTOLIC BLOOD PRESSURE: 132 MMHG | HEART RATE: 72 BPM | WEIGHT: 123 LBS

## 2024-02-22 DIAGNOSIS — E11.9 TYPE 2 DIABETES MELLITUS WITHOUT COMPLICATION, WITHOUT LONG-TERM CURRENT USE OF INSULIN (HCC): ICD-10-CM

## 2024-02-22 DIAGNOSIS — I10 ESSENTIAL HYPERTENSION: ICD-10-CM

## 2024-02-22 DIAGNOSIS — I25.118 CORONARY ARTERY DISEASE OF NATIVE ARTERY OF NATIVE HEART WITH STABLE ANGINA PECTORIS (HCC): Primary | ICD-10-CM

## 2024-02-22 DIAGNOSIS — E78.00 PURE HYPERCHOLESTEROLEMIA: ICD-10-CM

## 2024-02-22 ASSESSMENT — ENCOUNTER SYMPTOMS
COUGH: 0
BACK PAIN: 0
SHORTNESS OF BREATH: 0
ABDOMINAL PAIN: 0

## 2024-02-22 NOTE — PROGRESS NOTES
50% was spent in counseling and coordination of care.  Formatting of this note might be different from the original.  Last Assessment & Plan:   Patient with long-standing type 1 diabetes, strongly brittle.  Blood glucose level are variable with some hypoglycemia and hyperglycemia.  A1c 7.2%, which is not far off goal.  Blood sugars consistently elevated at night, will increase insulin before dinner by 1 unit.  No other changes made at this time.  Discussed patient trying a personal CGM again, patient's sister declined.    Yearly screening labs ordered prior to next visit.  I will follow-up with her in 6 months, she will call with any problems or concerns.    Spent 40 minutes with patient, greater than 50% was spent in counseling and coordination of care.      History of traumatic brain injury 07/03/2023     Priority: Low    Cough 08/13/2019     Priority: Low    Seasonal allergic rhinitis 08/16/2017     Priority: Low    Chronic brain syndrome 05/10/2016     Priority: Low    Hyperlipidemia 05/10/2016     Priority: Low    Type 2 diabetes mellitus without complications ([facility]) 05/10/2016     Priority: Low    Coronary artery disease of native artery of native heart with stable angina pectoris ([facility]) 05/10/2016     Priority: Low     09/2015:  3vCAD and  RCA  09/2015:  2.5x28 Xience LCx, 2.5x23 Xience LAD        Essential hypertension 05/10/2016     Priority: Low    Moderate persistent asthma without complication 04/29/2016     Priority: Low    Seizures ([facility]) 09/25/2015     Priority: Low    TBI (traumatic brain injury) ([facility]) 09/25/2015     Priority: Low    Autism 09/25/2015     Priority: Low    Type 2 diabetes mellitus without complication ([facility]) 09/24/2015     Priority: Low      Family History   Problem Relation Age of Onset    Stroke Mother     Heart Disease Father     Coronary Art Dis Father     Hypertension Other     Diabetes Other      Social History     Tobacco Use    Smoking status: Never    Smokeless tobacco: Never

## 2024-05-28 NOTE — TELEPHONE ENCOUNTER
Patient called refill line requesting  Fluticasone Propionate, Inhal, (FLOVENT DISKUS) 250 MCG/ACT AEPB. To go to Pharmacy: [facility] - [place] - [address] [address] [phone] - F [phone]. Chely wolff

## 2024-05-29 ENCOUNTER — TELEPHONE (OUTPATIENT)
Age: 59
End: 2024-05-29

## 2024-05-29 RX ORDER — FLUTICASONE PROPIONATE 250 UG/1
POWDER, METERED RESPIRATORY (INHALATION)
Qty: 1 EACH | Refills: 11 | Status: SHIPPED | OUTPATIENT
Start: 2024-05-29

## 2024-05-29 NOTE — TELEPHONE ENCOUNTER
Pt brother called on behalf of pt and stated that the pharmacy informed them that Medicare and Medicaid are refusing to pay for Alirocumab 150 mg/ml. They need prior auth or appeal sent to insurance for them to approve. Please call and advise

## 2024-05-31 NOTE — TELEPHONE ENCOUNTER
Praluent 150MG/ML auto-injectors    PA submitted via Cover [employer]. Sent to Gina to fax manually to [employer].

## 2024-06-01 ENCOUNTER — TELEPHONE (OUTPATIENT)
Age: 59
End: 2024-06-01

## 2024-06-01 NOTE — TELEPHONE ENCOUNTER
Repatha SureClick 140MG/ML auto-injectors    Your request has been approved. Authorization Expiration Date: May 31, 2025.    Received fax from [employer] stating that patient's has [employer] for prescription coverage and Repatha is preferred. Submitted PA for Repatha. Approved.

## 2024-06-04 ENCOUNTER — TELEPHONE (OUTPATIENT)
Dept: PULMONOLOGY | Age: 59
End: 2024-06-04

## 2024-06-04 ENCOUNTER — TELEPHONE (OUTPATIENT)
Age: 59
End: 2024-06-04

## 2024-06-04 RX ORDER — FLUTICASONE FUROATE 200 UG/1
POWDER RESPIRATORY (INHALATION)
Qty: 3 EACH | Refills: 3 | Status: SHIPPED | OUTPATIENT
Start: 2024-06-04 | End: 2024-06-04 | Stop reason: CLARIF

## 2024-06-04 NOTE — TELEPHONE ENCOUNTER
Arnuity 200, 1 inhalation every morning, gargle with water after use.  Stop flovent. I sent in RX.    Please contact her brother to let him know, he oversees her medications.

## 2024-06-04 NOTE — TELEPHONE ENCOUNTER
Patients pharmacy called the refill line requesting a prescription for Arnuity because patients insurance will not pay for the generic flovent they would pay for name brand but the pharmacist said they can not get the name brand. The patients insurance will pay for arnuity. Not sure which dose you want to send for the patient. ROSAURA

## 2024-06-05 NOTE — TELEPHONE ENCOUNTER
Jacinto is calling saying we were suppose to send a RX by fax to [facility] for  A new Cholesterol Pen, he  said the pharmacy does not have and we need to resend it ,to [facility] asap

## 2024-10-23 ENCOUNTER — TELEPHONE (OUTPATIENT)
Age: 59
End: 2024-10-23

## 2024-10-23 ENCOUNTER — OFFICE VISIT (OUTPATIENT)
Age: 59
End: 2024-10-23
Payer: MEDICARE

## 2024-10-23 VITALS
SYSTOLIC BLOOD PRESSURE: 116 MMHG | HEART RATE: 74 BPM | DIASTOLIC BLOOD PRESSURE: 54 MMHG | HEIGHT: 63 IN | BODY MASS INDEX: 22.25 KG/M2 | WEIGHT: 125.6 LBS

## 2024-10-23 DIAGNOSIS — I25.118 CORONARY ARTERY DISEASE OF NATIVE ARTERY OF NATIVE HEART WITH STABLE ANGINA PECTORIS (HCC): Primary | ICD-10-CM

## 2024-10-23 DIAGNOSIS — E11.9 TYPE 2 DIABETES MELLITUS WITHOUT COMPLICATION, WITHOUT LONG-TERM CURRENT USE OF INSULIN (HCC): ICD-10-CM

## 2024-10-23 DIAGNOSIS — E78.00 PURE HYPERCHOLESTEROLEMIA: ICD-10-CM

## 2024-10-23 DIAGNOSIS — I10 ESSENTIAL HYPERTENSION: ICD-10-CM

## 2024-10-23 PROCEDURE — 3078F DIAST BP <80 MM HG: CPT | Performed by: INTERNAL MEDICINE

## 2024-10-23 PROCEDURE — 1036F TOBACCO NON-USER: CPT | Performed by: INTERNAL MEDICINE

## 2024-10-23 PROCEDURE — 99214 OFFICE O/P EST MOD 30 MIN: CPT | Performed by: INTERNAL MEDICINE

## 2024-10-23 PROCEDURE — G8427 DOCREV CUR MEDS BY ELIG CLIN: HCPCS | Performed by: INTERNAL MEDICINE

## 2024-10-23 PROCEDURE — 2022F DILAT RTA XM EVC RTNOPTHY: CPT | Performed by: INTERNAL MEDICINE

## 2024-10-23 PROCEDURE — 3017F COLORECTAL CA SCREEN DOC REV: CPT | Performed by: INTERNAL MEDICINE

## 2024-10-23 PROCEDURE — G8484 FLU IMMUNIZE NO ADMIN: HCPCS | Performed by: INTERNAL MEDICINE

## 2024-10-23 PROCEDURE — 3074F SYST BP LT 130 MM HG: CPT | Performed by: INTERNAL MEDICINE

## 2024-10-23 PROCEDURE — 3046F HEMOGLOBIN A1C LEVEL >9.0%: CPT | Performed by: INTERNAL MEDICINE

## 2024-10-23 PROCEDURE — G8420 CALC BMI NORM PARAMETERS: HCPCS | Performed by: INTERNAL MEDICINE

## 2024-10-23 RX ORDER — CLOPIDOGREL BISULFATE 75 MG/1
75 TABLET ORAL DAILY
Qty: 90 TABLET | Refills: 3 | Status: SHIPPED | OUTPATIENT
Start: 2024-10-23

## 2024-10-23 RX ORDER — NITROGLYCERIN 0.3 MG/1
0.3 TABLET SUBLINGUAL EVERY 5 MIN PRN
Qty: 30 TABLET | Refills: 3 | Status: SHIPPED | OUTPATIENT
Start: 2024-10-23

## 2024-10-23 RX ORDER — PITAVASTATIN CALCIUM 2.09 MG/1
2 TABLET, FILM COATED ORAL NIGHTLY
Qty: 90 TABLET | Refills: 3 | Status: SHIPPED | OUTPATIENT
Start: 2024-10-23

## 2024-10-23 ASSESSMENT — ENCOUNTER SYMPTOMS
COUGH: 0
BACK PAIN: 0
ABDOMINAL PAIN: 0
SHORTNESS OF BREATH: 0

## 2024-10-23 NOTE — PROGRESS NOTES
[facility] CARDIOLOGY  [address] [place] [address], [address]  [place] [address]      10/23/24      NAME:  Deedee Ordoñez  : 1965  MRN: [id number]      SUBJECTIVE:   Deedee Ordoñez is a 58 y.o. female seen for a follow up visit regarding the following:     Chief Complaint   Patient presents with    Coronary Artery Disease         HPI:    58 y.o. female admitted 2015 with NSTEMI and cardiac catheterization with 3vCAD and normal LVEF.  She was thought to be high risk for CABG and had PCI of the LAD and LCx.  The RCA is a  and fills well by collaterals.  Patient has done well on medical therapy.  She has chronic anxiety and depression and struggles with these issues.  Developed leg pain and weakness.  Stopped rosuvastatin and resolved.  Tolerates Livalo.            Past Medical History, Past Surgical History, Family history, Social History, and Medications were all reviewed with the patient today and updated as necessary.     Current Outpatient Medications   Medication Sig Dispense Refill    Fluticasone Furoate (ARNUITY ELLIPTA IN) Inhale into the lungs      clopidogrel (PLAVIX) 75 MG tablet Take 1 tablet by mouth daily 90 tablet 3    nitroGLYCERIN (NITROSTAT) 0.3 MG SL tablet Place 1 tablet under the tongue every 5 minutes as needed for Chest pain 30 tablet 3    pitavastatin (LIVALO) 2 MG TABS tablet Take 1 tablet by mouth nightly 90 tablet 3    alirocumab 150 MG/ML SOAJ Inject 150 mg into the skin every 14 days 2 Adjustable Dose Pre-filled Pen Syringe 5    Multiple Vitamins-Minerals (MULTIVITAL PO) Take by mouth      albuterol (PROVENTIL) (2.5 MG/3ML) 0.083% nebulizer solution 3 mL via nebulizer 4 times daily as needed for shortness of breath or wheezing.  Diagnosis-asthma, J 45.40, bill to Medicare part B 360 mL 11    albuterol sulfate HFA (PROVENTIL;VENTOLIN;PROAIR) 108 (90 Base) MCG/ACT inhaler Inhale 2 puffs into the lungs every 4 hours as needed for Wheezing or Shortness of Breath 3 each 3

## 2024-12-20 ENCOUNTER — OFFICE VISIT (OUTPATIENT)
Dept: PULMONOLOGY | Age: 59
End: 2024-12-20

## 2024-12-20 VITALS
HEIGHT: 63 IN | RESPIRATION RATE: 20 BRPM | WEIGHT: 123 LBS | DIASTOLIC BLOOD PRESSURE: 80 MMHG | OXYGEN SATURATION: 98 % | BODY MASS INDEX: 21.79 KG/M2 | TEMPERATURE: 98 F | SYSTOLIC BLOOD PRESSURE: 120 MMHG | HEART RATE: 86 BPM

## 2024-12-20 DIAGNOSIS — Z87.820 HISTORY OF TRAUMATIC BRAIN INJURY: ICD-10-CM

## 2024-12-20 DIAGNOSIS — J45.40 MODERATE PERSISTENT ASTHMA WITHOUT COMPLICATION: Primary | ICD-10-CM

## 2024-12-20 DIAGNOSIS — J30.2 SEASONAL ALLERGIC RHINITIS, UNSPECIFIED TRIGGER: ICD-10-CM

## 2024-12-20 LAB
EXPIRATORY TIME: NORMAL
FEF 25-75% %PRED-PRE: NORMAL
FEF 25-75% PRED: NORMAL
FEF 25-75-PRE: NORMAL
FEV1 %PRED-PRE: 73 %
FEV1 PRED: NORMAL
FEV1/FVC %PRED-PRE: NORMAL
FEV1/FVC PRED: NORMAL
FEV1/FVC: 66 %
FEV1: 1.76 L
FVC %PRED-PRE: 87 %
FVC PRED: NORMAL
FVC: 2.67 L
PEF %PRED-PRE: NORMAL
PEF PRED: NORMAL
PEF-PRE: NORMAL

## 2024-12-20 RX ORDER — ALBUTEROL SULFATE 0.83 MG/ML
SOLUTION RESPIRATORY (INHALATION)
Qty: 360 ML | Refills: 11 | Status: SHIPPED | OUTPATIENT
Start: 2024-12-20

## 2024-12-20 RX ORDER — FLUTICASONE PROPIONATE 50 MCG
2 SPRAY, SUSPENSION (ML) NASAL DAILY
Qty: 3 EACH | Refills: 3 | Status: SHIPPED | OUTPATIENT
Start: 2024-12-20

## 2024-12-20 RX ORDER — MONTELUKAST SODIUM 10 MG/1
TABLET ORAL
Qty: 90 TABLET | Refills: 3 | Status: SHIPPED | OUTPATIENT
Start: 2024-12-20

## 2024-12-20 RX ORDER — AZELASTINE 1 MG/ML
SPRAY, METERED NASAL
Qty: 60 ML | Refills: 1 | Status: SHIPPED | OUTPATIENT
Start: 2024-12-20

## 2024-12-20 RX ORDER — IPRATROPIUM BROMIDE 21 UG/1
SPRAY, METERED NASAL
Qty: 1 EACH | Refills: 11 | Status: SHIPPED | OUTPATIENT
Start: 2024-12-20

## 2024-12-20 RX ORDER — FLUTICASONE FUROATE 200 UG/1
POWDER RESPIRATORY (INHALATION)
Qty: 3 EACH | Refills: 3 | Status: SHIPPED | OUTPATIENT
Start: 2024-12-20

## 2024-12-20 RX ORDER — ALBUTEROL SULFATE 90 UG/1
2 INHALANT RESPIRATORY (INHALATION) EVERY 4 HOURS PRN
Qty: 3 EACH | Refills: 3 | Status: SHIPPED | OUTPATIENT
Start: 2024-12-20

## 2024-12-20 ASSESSMENT — ENCOUNTER SYMPTOMS
WHEEZING: 0
COUGH: 0
SHORTNESS OF BREATH: 0
SPUTUM PRODUCTION: 0
HEMOPTYSIS: 0

## 2024-12-20 ASSESSMENT — PULMONARY FUNCTION TESTS
FEV1: 1.76
FVC: 2.67
FEV1_PERCENT_PREDICTED_PRE: 73
FVC_PERCENT_PREDICTED_PRE: 87
FEV1/FVC: 66

## 2024-12-20 NOTE — PATIENT INSTRUCTIONS
Continue Arnuity 1 elation every morning, gargle with water after use.    Continue albuterol albuterol inhaler, 2 puffs 4 times daily if needed, or albuterol via nebulizer 4 times daily if needed.     Continue Flonase 1 each nostril twice daily     atrovent nasal spray - 2 sprays each nostril 1 - 2 times daily.     Continue azelastine 1 spray each nostril daily.     Continue Singulair 10 mg at bedtime.    Continue Allegra or Claritin, 1 daily.    States that she has had newest COVID booster and seasonal flu vaccine.    Recommend Prevnar 20 pneumonia vaccine and RSV vaccine if insurance will reimburse for it.  Prescriptions provided.

## 2024-12-20 NOTE — PROGRESS NOTES
She is a very pleasant 59-year-old female seen today for follow-up of asthma, allergic rhinitis, chronic cough.  Her brother assists with history and provides transportation to her medical appointments.  Her sister, who provided transportation to her to appointments, as well as her sister's  passed away secondary to Covid.     She is currently residing with her brother, Jacinto.  She also attends adult .  She is appropriately conversant during today's appointment.         DIAGNOSTICS:       ECHO 9/2015 - EF 55 - 60%.  Cardiac cath/stent 9/2015 (severe multivessel disease; stent to LAD, left CX)    CXR 9/2015.  CXR 10/30/2015.  Spirometry 1/25/2016 - normal, borderline mild obstruction.  Spirometry 12/5/2016- moderately severe obstructive defect with decreased FVC, significant decline in FVC and FEV1.  Spirometry 8/16/2017-mild restrictive defect, significant improvement especially in FEV1.   Spirometry 8/10/2018-mild restrictive defect.  No significant interval change. Remains improved compared to 2016 study.  Spirometry 8/13/2019-mild restrictive defect, no significant change.   Spirometry 7/5/2022- suggest mild restrictive defect with slight decline in FVC and FEV1, some of which may be related to weight gain.  Spirometry 1/18/2024-normal, significant improvement in FVC and FEV1.  
tablet by mouth daily    vitamin D (CHOLECALCIFEROL) 25 MCG (1000 UT) TABS tablet Take by mouth daily    insulin aspart protamine-insulin aspart (NOVOLOG 70/30) (70-30) 100 UNIT/ML injection Inject into the skin every morning (before breakfast)    insulin glargine (LANTUS) 100 UNIT/ML injection vial Inject into the skin    phenytoin (DILANTIN) 100 MG ER capsule Take 1 capsule by mouth 3 times daily    Evolocumab 140 MG/ML SOAJ Inject 140 mg into the skin every 14 days (Patient not taking: Reported on 10/23/2024)    fluticasone propionate (FLOVENT DISKUS) 250 MCG/ACT inhaler 1 inhalation twice daily, rinse mouth after use (Patient not taking: Reported on 10/23/2024)    UNABLE TO FIND Nervive (Patient not taking: Reported on 10/23/2024)     No current facility-administered medications for this visit.       Over 50% of today's office visit was spent in face to face time reviewing test results, prognosis, importance of compliance, education about disease process, benefits of medications, instructions for management of acute symptoms, and follow up plans.     Electronically signed. Dictated using voice recognition software.  Proof read but unrecognized errors may exist.

## 2025-04-23 ENCOUNTER — OFFICE VISIT (OUTPATIENT)
Age: 60
End: 2025-04-23
Payer: MEDICARE

## 2025-04-23 VITALS
SYSTOLIC BLOOD PRESSURE: 136 MMHG | HEIGHT: 63 IN | WEIGHT: 125 LBS | HEART RATE: 71 BPM | BODY MASS INDEX: 22.15 KG/M2 | DIASTOLIC BLOOD PRESSURE: 68 MMHG

## 2025-04-23 DIAGNOSIS — E10.649 TYPE 1 DIABETES MELLITUS WITH HYPOGLYCEMIA AND WITHOUT COMA (HCC): ICD-10-CM

## 2025-04-23 DIAGNOSIS — E11.9 TYPE 2 DIABETES MELLITUS WITHOUT COMPLICATION, WITHOUT LONG-TERM CURRENT USE OF INSULIN (HCC): ICD-10-CM

## 2025-04-23 DIAGNOSIS — E78.00 PURE HYPERCHOLESTEROLEMIA: ICD-10-CM

## 2025-04-23 DIAGNOSIS — I10 ESSENTIAL HYPERTENSION: ICD-10-CM

## 2025-04-23 DIAGNOSIS — I25.118 CORONARY ARTERY DISEASE OF NATIVE ARTERY OF NATIVE HEART WITH STABLE ANGINA PECTORIS: Primary | ICD-10-CM

## 2025-04-23 PROCEDURE — 2022F DILAT RTA XM EVC RTNOPTHY: CPT | Performed by: INTERNAL MEDICINE

## 2025-04-23 PROCEDURE — 99214 OFFICE O/P EST MOD 30 MIN: CPT | Performed by: INTERNAL MEDICINE

## 2025-04-23 PROCEDURE — 3046F HEMOGLOBIN A1C LEVEL >9.0%: CPT | Performed by: INTERNAL MEDICINE

## 2025-04-23 PROCEDURE — G8427 DOCREV CUR MEDS BY ELIG CLIN: HCPCS | Performed by: INTERNAL MEDICINE

## 2025-04-23 PROCEDURE — 3078F DIAST BP <80 MM HG: CPT | Performed by: INTERNAL MEDICINE

## 2025-04-23 PROCEDURE — G8420 CALC BMI NORM PARAMETERS: HCPCS | Performed by: INTERNAL MEDICINE

## 2025-04-23 PROCEDURE — 93000 ELECTROCARDIOGRAM COMPLETE: CPT | Performed by: INTERNAL MEDICINE

## 2025-04-23 PROCEDURE — 3075F SYST BP GE 130 - 139MM HG: CPT | Performed by: INTERNAL MEDICINE

## 2025-04-23 PROCEDURE — 3017F COLORECTAL CA SCREEN DOC REV: CPT | Performed by: INTERNAL MEDICINE

## 2025-04-23 PROCEDURE — 1036F TOBACCO NON-USER: CPT | Performed by: INTERNAL MEDICINE

## 2025-04-23 RX ORDER — CLOPIDOGREL BISULFATE 75 MG/1
75 TABLET ORAL DAILY
Qty: 90 TABLET | Refills: 3 | Status: SHIPPED | OUTPATIENT
Start: 2025-04-23

## 2025-04-23 RX ORDER — PITAVASTATIN CALCIUM 2.09 MG/1
2 TABLET, FILM COATED ORAL NIGHTLY
Qty: 90 TABLET | Refills: 3 | Status: SHIPPED | OUTPATIENT
Start: 2025-04-23

## 2025-04-23 RX ORDER — LACOSAMIDE 200 MG/1
200 TABLET ORAL 2 TIMES DAILY
COMMUNITY

## 2025-04-23 ASSESSMENT — ENCOUNTER SYMPTOMS
BACK PAIN: 0
ABDOMINAL PAIN: 0
COUGH: 0
SHORTNESS OF BREATH: 0

## 2025-04-23 NOTE — PROGRESS NOTES
[facility] CARDIOLOGY  [address] [place] [address], [address]  [place] [address]      25      NAME:  Deedee Ordoñez  : 1965  MRN: [id number]      SUBJECTIVE:   Deedee Ordoñez is a 59 y.o. female seen for a follow up visit regarding the following:     Chief Complaint   Patient presents with    Coronary Artery Disease         HPI:    59 y.o. female admitted 2015 with NSTEMI and cardiac catheterization with 3vCAD and normal LVEF.  She was thought to be high risk for CABG and had PCI of the LAD and LCx.  The RCA is a  and fills well by collaterals.  Patient has done well on medical therapy.  She has chronic anxiety and depression and struggles with these issues.  Developed leg pain and weakness.  Stopped rosuvastatin and resolved.  Tolerates Livalo and repatha..            Past Medical History, Past Surgical History, Family history, Social History, and Medications were all reviewed with the patient today and updated as necessary.     Current Outpatient Medications   Medication Sig Dispense Refill    lacosamide (VIMPAT) 200 MG tablet Take 1 tablet by mouth 2 times daily. Max Daily Amount: 400 mg      Evolocumab 140 MG/ML SOAJ Inject 140 mg into the skin every 14 days 2 Adjustable Dose Pre-filled Pen Syringe 11    clopidogrel (PLAVIX) 75 MG tablet Take 1 tablet by mouth daily 90 tablet 3    pitavastatin (LIVALO) 2 MG TABS tablet Take 1 tablet by mouth nightly 90 tablet 3    albuterol (PROVENTIL) (2.5 MG/3ML) 0.083% nebulizer solution 3 mL via nebulizer 4 times daily as needed for shortness of breath or wheezing.  Diagnosis-asthma, J 45.40, bill to Medicare part B 360 mL 11    albuterol sulfate HFA (PROVENTIL;VENTOLIN;PROAIR) 108 (90 Base) MCG/ACT inhaler Inhale 2 puffs into the lungs every 4 hours as needed for Wheezing or Shortness of Breath 3 each 3    fluticasone (FLONASE) 50 MCG/ACT nasal spray 2 sprays by Nasal route daily 2 sprays each nostrils as needed 3 each 3    azelastine (ASTELIN) 0.1 %

## 2025-05-13 ENCOUNTER — TELEPHONE (OUTPATIENT)
Age: 60
End: 2025-05-13